# Patient Record
Sex: MALE | Race: WHITE | NOT HISPANIC OR LATINO
[De-identification: names, ages, dates, MRNs, and addresses within clinical notes are randomized per-mention and may not be internally consistent; named-entity substitution may affect disease eponyms.]

---

## 2017-10-06 ENCOUNTER — APPOINTMENT (OUTPATIENT)
Dept: CARDIOLOGY | Facility: CLINIC | Age: 77
End: 2017-10-06
Payer: MEDICARE

## 2017-10-06 PROCEDURE — 99214 OFFICE O/P EST MOD 30 MIN: CPT

## 2017-10-06 PROCEDURE — 93000 ELECTROCARDIOGRAM COMPLETE: CPT

## 2017-10-07 ENCOUNTER — TRANSCRIPTION ENCOUNTER (OUTPATIENT)
Age: 77
End: 2017-10-07

## 2017-10-12 ENCOUNTER — APPOINTMENT (OUTPATIENT)
Dept: CARDIOLOGY | Facility: CLINIC | Age: 77
End: 2017-10-12
Payer: MEDICARE

## 2017-10-17 PROCEDURE — 93224 XTRNL ECG REC UP TO 48 HRS: CPT

## 2017-10-19 ENCOUNTER — RECORD ABSTRACTING (OUTPATIENT)
Age: 77
End: 2017-10-19

## 2017-10-19 DIAGNOSIS — Z82.3 FAMILY HISTORY OF STROKE: ICD-10-CM

## 2017-10-19 DIAGNOSIS — Z87.891 PERSONAL HISTORY OF NICOTINE DEPENDENCE: ICD-10-CM

## 2017-10-19 DIAGNOSIS — Z87.448 PERSONAL HISTORY OF OTHER DISEASES OF URINARY SYSTEM: ICD-10-CM

## 2017-10-19 DIAGNOSIS — A63.0 ANOGENITAL (VENEREAL) WARTS: ICD-10-CM

## 2017-10-19 DIAGNOSIS — Z87.19 PERSONAL HISTORY OF OTHER DISEASES OF THE DIGESTIVE SYSTEM: ICD-10-CM

## 2017-10-19 DIAGNOSIS — Z80.9 FAMILY HISTORY OF MALIGNANT NEOPLASM, UNSPECIFIED: ICD-10-CM

## 2017-10-19 DIAGNOSIS — Z98.890 OTHER SPECIFIED POSTPROCEDURAL STATES: ICD-10-CM

## 2017-10-19 DIAGNOSIS — M10.9 GOUT, UNSPECIFIED: ICD-10-CM

## 2017-10-27 ENCOUNTER — APPOINTMENT (OUTPATIENT)
Dept: CARDIOLOGY | Facility: CLINIC | Age: 77
End: 2017-10-27

## 2017-11-06 ENCOUNTER — APPOINTMENT (OUTPATIENT)
Dept: CARDIOLOGY | Facility: CLINIC | Age: 77
End: 2017-11-06
Payer: MEDICARE

## 2017-11-06 PROCEDURE — 99215 OFFICE O/P EST HI 40 MIN: CPT

## 2017-12-05 ENCOUNTER — APPOINTMENT (OUTPATIENT)
Dept: CARDIOLOGY | Facility: CLINIC | Age: 77
End: 2017-12-05

## 2018-06-05 ENCOUNTER — NON-APPOINTMENT (OUTPATIENT)
Age: 78
End: 2018-06-05

## 2018-06-05 ENCOUNTER — APPOINTMENT (OUTPATIENT)
Dept: CARDIOLOGY | Facility: CLINIC | Age: 78
End: 2018-06-05
Payer: MEDICARE

## 2018-06-05 VITALS
HEIGHT: 67 IN | BODY MASS INDEX: 31.39 KG/M2 | DIASTOLIC BLOOD PRESSURE: 78 MMHG | WEIGHT: 200 LBS | SYSTOLIC BLOOD PRESSURE: 160 MMHG | HEART RATE: 48 BPM

## 2018-06-05 VITALS — DIASTOLIC BLOOD PRESSURE: 72 MMHG | SYSTOLIC BLOOD PRESSURE: 145 MMHG

## 2018-06-05 PROCEDURE — 99215 OFFICE O/P EST HI 40 MIN: CPT

## 2018-06-05 PROCEDURE — 93000 ELECTROCARDIOGRAM COMPLETE: CPT

## 2018-06-05 RX ORDER — FEBUXOSTAT 80 MG/1
80 TABLET ORAL DAILY
Refills: 0 | Status: DISCONTINUED | COMMUNITY
End: 2018-06-05

## 2018-06-08 ENCOUNTER — APPOINTMENT (OUTPATIENT)
Age: 78
End: 2018-06-08
Payer: MEDICARE

## 2018-06-08 PROCEDURE — 93224 XTRNL ECG REC UP TO 48 HRS: CPT

## 2018-06-20 ENCOUNTER — APPOINTMENT (OUTPATIENT)
Dept: CARDIOLOGY | Facility: CLINIC | Age: 78
End: 2018-06-20
Payer: MEDICARE

## 2018-06-20 PROCEDURE — 93306 TTE W/DOPPLER COMPLETE: CPT

## 2018-07-09 ENCOUNTER — APPOINTMENT (OUTPATIENT)
Dept: CARDIOLOGY | Facility: CLINIC | Age: 78
End: 2018-07-09
Payer: MEDICARE

## 2018-07-09 ENCOUNTER — RECORD ABSTRACTING (OUTPATIENT)
Age: 78
End: 2018-07-09

## 2018-07-09 VITALS
HEART RATE: 58 BPM | BODY MASS INDEX: 31.23 KG/M2 | WEIGHT: 199 LBS | HEIGHT: 67 IN | OXYGEN SATURATION: 99 % | DIASTOLIC BLOOD PRESSURE: 60 MMHG | SYSTOLIC BLOOD PRESSURE: 110 MMHG

## 2018-07-09 PROCEDURE — 99214 OFFICE O/P EST MOD 30 MIN: CPT

## 2018-07-09 RX ORDER — ASPIRIN 81 MG
81 TABLET, DELAYED RELEASE (ENTERIC COATED) ORAL DAILY
Refills: 0 | Status: DISCONTINUED | COMMUNITY
End: 2018-07-09

## 2019-06-18 ENCOUNTER — NON-APPOINTMENT (OUTPATIENT)
Age: 79
End: 2019-06-18

## 2019-06-18 ENCOUNTER — APPOINTMENT (OUTPATIENT)
Dept: CARDIOLOGY | Facility: CLINIC | Age: 79
End: 2019-06-18
Payer: MEDICARE

## 2019-06-18 VITALS
BODY MASS INDEX: 30.45 KG/M2 | DIASTOLIC BLOOD PRESSURE: 70 MMHG | SYSTOLIC BLOOD PRESSURE: 142 MMHG | HEART RATE: 47 BPM | HEIGHT: 67 IN | WEIGHT: 194 LBS

## 2019-06-18 PROCEDURE — 0296T: CPT | Mod: 59

## 2019-06-18 PROCEDURE — 93000 ELECTROCARDIOGRAM COMPLETE: CPT

## 2019-06-18 PROCEDURE — 99215 OFFICE O/P EST HI 40 MIN: CPT

## 2019-06-18 RX ORDER — RAMIPRIL 10 MG/1
10 CAPSULE ORAL
Refills: 0 | Status: DISCONTINUED | COMMUNITY
End: 2019-06-18

## 2019-06-18 RX ORDER — ROSUVASTATIN CALCIUM 20 MG/1
20 TABLET, FILM COATED ORAL
Refills: 0 | Status: DISCONTINUED | COMMUNITY
End: 2019-06-18

## 2019-06-18 NOTE — REASON FOR VISIT
[Follow-Up - Clinic] : a clinic follow-up of [Abnormal ECG] : an abnormal ECG [A-V Block] : A-V block [Hyperlipidemia] : hyperlipidemia [Hypertension] : hypertension [Medication Management] : Medication management [Peripheral Vascular Disease] : peripheral vascular disease [Sick Sinus Syndrome] : sick sinus syndrome

## 2019-06-19 ENCOUNTER — TRANSCRIPTION ENCOUNTER (OUTPATIENT)
Age: 79
End: 2019-06-19

## 2019-06-25 NOTE — PHYSICAL EXAM
[General Appearance - Well Developed] : well developed [Normal Appearance] : normal appearance [Well Groomed] : well groomed [General Appearance - Well Nourished] : well nourished [No Deformities] : no deformities [General Appearance - In No Acute Distress] : no acute distress [Normal Conjunctiva] : the conjunctiva exhibited no abnormalities [Eyelids - No Xanthelasma] : the eyelids demonstrated no xanthelasmas [Normal Oral Mucosa] : normal oral mucosa [No Oral Pallor] : no oral pallor [No Oral Cyanosis] : no oral cyanosis [Normal Jugular Venous A Waves Present] : normal jugular venous A waves present [Normal Jugular Venous V Waves Present] : normal jugular venous V waves present [No Jugular Venous Tran A Waves] : no jugular venous tran A waves [Respiration, Rhythm And Depth] : normal respiratory rhythm and effort [Exaggerated Use Of Accessory Muscles For Inspiration] : no accessory muscle use [Auscultation Breath Sounds / Voice Sounds] : lungs were clear to auscultation bilaterally [Heart Rate And Rhythm] : heart rate and rhythm were normal [Heart Sounds] : normal S1 and S2 [Murmurs] : no murmurs present [Abdomen Soft] : soft [Abdomen Tenderness] : non-tender [Abdomen Mass (___ Cm)] : no abdominal mass palpated [Abnormal Walk] : normal gait [Gait - Sufficient For Exercise Testing] : the gait was sufficient for exercise testing [Nail Clubbing] : no clubbing of the fingernails [Cyanosis, Localized] : no localized cyanosis [Petechial Hemorrhages (___cm)] : no petechial hemorrhages [Skin Color & Pigmentation] : normal skin color and pigmentation [] : no rash [No Venous Stasis] : no venous stasis [Skin Lesions] : no skin lesions [No Skin Ulcers] : no skin ulcer [No Xanthoma] : no  xanthoma was observed [Oriented To Time, Place, And Person] : oriented to person, place, and time [Affect] : the affect was normal [Mood] : the mood was normal [No Anxiety] : not feeling anxious [FreeTextEntry1] : tr edema

## 2019-06-25 NOTE — HISTORY OF PRESENT ILLNESS
[FreeTextEntry1] : JOSE ALBERTO MENDEZ  is a 78 year old  M\par Concomitant cardiovascular care by Dr. Alejandro Martin at [facility]\par Also primary physician is Dr. Shin at [facility] and seen by electrophysiology at Dr. Geo Chaudhry as well as Dr. Neeraj Larry for long-standing hypertension. \par \par There is a history of coronary artery disease.  In 1996, had angina and underwent cardiac catheterization at [facility], which was complicated by a left main dissection which subsequently led to two-vessel bypass surgery.  He had a repeat cardiac catheterization in 2006 and underwent PCI to right coronary artery. There is no clinical history of a myocardial infarction.  Also told of carotid atherosclerosis.   No recent ischemic evaluation\par \par There is a long-standing history of hypertension since the age of 23 and particularly his blood pressure can be sensitive to increased sodium intake with associated development of peripheral edema for which he self adjusts torsemide dose whe eating outside the home. There is no prior history of symptomatic congestive heart failure or LV dysfunction.   There is CRI, followed at [facility].  Saw Dr Larry, lowered dose of doxazosin due to orthostasis.  There is a history of microalbuminuria.  Takes additional dose of torsemide based on diet.  \par \par h/o  atrial tachycardia and atrial flutter, ? asymptomatic. He underwent an ablation procedure with Dr. Chaudhry. There is also a history of chronic PVCs as well as AV conduction disease. 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  He is intolerant to beta-blocker therapy.   Unware of premature beats.  Saw EP and and no changes were made. \par \par Previously he has been told of mild valvular heart disease. \par There is no prior history of cerebrovascular disease.  \par Now on CPAP for sleep apnea. \par \par No recent hospitalization, procedures\par \par Outside EKG reviewed. Outside EKG demonstrated 2nd degree block with heart rate less than 40. Subsequent office EKG is sinus bradycardia with first degree AV block. \par He underwent a Holter. He was told to go to the emergency room. \par \par Has absolutely no symptoms. \par Does work with  and does bike. \par He remains physically active\par No significant exertional symptoms. \par There is no chest pain, no orthopnea, no significant dyspnea, no symptomatic palpitations, lightheadedness, dizziness, or syncope. \par In the interim, he had neurosurgical resection of a meningioma. \par Now on a lower dose of Crestor and ramipril \par Adjusts his dose of torsemide based on sodium intake.\par \par EKG AF nonspecific ST changes\par \par Echocardiogram. June 2018 Normal left ventricular function. Ejection fraction 55%. Moderate left atrial enlargement. Mild valvular heart disease. Afib throughout test\par \par Holter monitor June 2018, atrial fibrillation, minimum heart rate 22 beats per minute, maximum heart rate 100 beats per minute, average heart rate 50 beats per minute. Pauses of 2-4 seconds noted. Wide-complex tachycardia with aberrancy versus VT 3 beats noted.\par \par Reviewed electrophysiology report from June 2012. At that time underwent catheter ablation of typical isthmus-dependent right atrial flutter. There is acceptable baseline AV conduction noted. Reviewed electrophysiology notes from Dr. Chaudhry from May of 2012. \par \par Transesophageal echocardiogram from June 2012 notable for complex aortic atherosclerosis with mild valvular heart disease\par \par Cardiac catheterization report in May 2006 successful stent performed on 90% lesion in mid RCA. \par \par Nuclear stress test February 2013. Lino protocol. 9 minutes 50 seconds. Ischemic EKG response. Abnormal perfusion with mild fixed defects involving mid to distal anterior wall and apex. Mild to moderate fixed defects involving inferior wall. \par \par April 2018. Hemoglobin 13.0, potassium 5.1, creatinine 2.4. HDL 38, LDL 43, total cholesterol 132. \par

## 2019-06-25 NOTE — ASSESSMENT
[FreeTextEntry1] : Recent testing has been requested from [facility]\par \par Atrial Flutter: s/p RFA Chaudhry [facility]\par Baseline AV conduction disease, First degree AV block second degree AV block\par PVC/NSVT: normal EF outflow tract?\par AF\par Reviewed diagnosis natural history and pathophysiology. \par Followup Zio patch. \par Elevated thromboembolic risk profile. Renally dosed Xarelto 15\par Followup with electrophysiology.\par Intolerant of beta blocker\par Monitor LV function\par Treatment of underlying obstructive sleep apnea.\par \par CAD: PCI BMS to L cxL main dissection then 2v CABG '96 (LIMA to LAD SVG to OM1) repeat PCI NEAL RCA '06.\par Prior stent and bypass grafting\par Last cardiac catheterization report and stress as outlined above.  \par Followup echocardiogram.\par No angina.  \par Daily aspirin and statin therapy.\par Last cholesterol profile reviewed. \par \par Longstanding HTN Larry\par CRI, Rogosin\par Edema\par Hypertensive heart disease and renal insufficiency\par Proteinuria\par Maintained on ACE inhibitor, cardura and torsemide. \par \par Atherosclerosis: complex atheroma carotid stenosis (moderate).\par Abdominal ultrasound, risk factor modification as above.\par

## 2019-06-26 ENCOUNTER — APPOINTMENT (OUTPATIENT)
Dept: CARDIOLOGY | Facility: CLINIC | Age: 79
End: 2019-06-26
Payer: MEDICARE

## 2019-06-26 PROCEDURE — 93306 TTE W/DOPPLER COMPLETE: CPT

## 2019-06-28 PROCEDURE — 0298T: CPT

## 2019-07-15 ENCOUNTER — APPOINTMENT (OUTPATIENT)
Dept: CARDIOLOGY | Facility: CLINIC | Age: 79
End: 2019-07-15
Payer: MEDICARE

## 2019-07-15 VITALS
SYSTOLIC BLOOD PRESSURE: 162 MMHG | HEIGHT: 67 IN | WEIGHT: 198 LBS | DIASTOLIC BLOOD PRESSURE: 68 MMHG | HEART RATE: 77 BPM | OXYGEN SATURATION: 97 % | BODY MASS INDEX: 31.08 KG/M2

## 2019-07-15 DIAGNOSIS — G47.33 OBSTRUCTIVE SLEEP APNEA (ADULT) (PEDIATRIC): ICD-10-CM

## 2019-07-15 PROCEDURE — 99215 OFFICE O/P EST HI 40 MIN: CPT

## 2019-07-15 RX ORDER — ALLOPURINOL 100 MG/1
100 TABLET ORAL DAILY
Refills: 0 | Status: DISCONTINUED | COMMUNITY
End: 2019-07-15

## 2019-07-18 PROBLEM — G47.33 OBSTRUCTIVE SLEEP APNEA, ADULT: Status: ACTIVE | Noted: 2018-06-05

## 2019-07-18 NOTE — ASSESSMENT
[FreeTextEntry1] : Atrial Flutter: s/p RFA Chaudhry [facility]\par Baseline AV conduction disease\par PVC/NSVT: normal EF outflow tract?\par AF\par Reviewed diagnosis natural history and pathophysiology. \par Monitor reviewed\par Elevated thromboembolic risk profile. Renally dosed Xarelto 15\par Followup with electrophysiology.\par Intolerant of beta blocker\par Treatment of underlying obstructive sleep apnea.\par \par CAD: PCI BMS to L cxL main dissection then 2v CABG '96 (LIMA to LAD SVG to OM1) repeat PCI NEAL RCA '06.\par Prior stent and bypass grafting\par Last cardiac catheterization report \par No angina.  \par Normal EF\par Follow up stress\par Daily aspirin and statin therapy.\par \par Longstanding HTN Larry\par CRI, Rogosin\par Edema\par Hypertensive heart disease and renal insufficiency\par Proteinuria\par Maintained on ACE inhibitor, cardura and torsemide. \par \par Atherosclerosis: complex atheroma carotid stenosis (moderate).\par Abdominal ultrasound, risk factor modification as above.\par

## 2019-07-18 NOTE — HISTORY OF PRESENT ILLNESS
[FreeTextEntry1] : JOSE ALBERTO MENDEZ  is a 78 year old  M\par \par Concomitant cardiovascular care by Dr. Alejandro Martin at [facility]\par Also primary physician is Dr. Shin at [facility] and seen by electrophysiology at Dr. Geo Chaudhry as well as Dr. Neeraj Larry for long-standing hypertension. \par \par There is a history of coronary artery disease.  In 1996, had angina and underwent cardiac catheterization at [facility], which was complicated by a left main dissection which subsequently led to two-vessel bypass surgery.  He had a repeat cardiac catheterization in 2006 and underwent PCI to right coronary artery. There is no clinical history of a myocardial infarction.  Also told of carotid atherosclerosis.   No recent ischemic evaluation\par \par There is a long-standing history of hypertension since the age of 23 and particularly his blood pressure can be sensitive to increased sodium intake with associated development of peripheral edema for which he self adjusts torsemide dose whe eating outside the home. There is no prior history of LV dysfunction.   There is CRI, followed at [facility].  Saw Dr Larry, lowered dose of doxazosin due to orthostasis.  There is a history of microalbuminuria.  \par \par h/o  atrial tachycardia and atrial flutter, ? asymptomatic. He underwent an ablation procedure with Dr. Chaudhry. There is also a history of chronic PVCs as well as AV conduction disease.  He is intolerant to beta-blocker therapy.   Unware of premature beats.  Saw EP and and no changes were made. \par \par Previously he has been told of mild valvular heart disease. \par There is no prior history of cerebrovascular disease.  \par \par Works with \par He remains physically active\par No significant exertional symptoms. \par There is no chest pain, no orthopnea, no significant dyspnea, no symptomatic palpitations, lightheadedness, dizziness, or syncope. \par Restarted CPAP therapy. \par Scheduled to see Dr. Martin in the fall. \par \par Echocardiogram June 2019 is normal left ventricular function. Mild mitral regurgitation. Mild aortic stenosis. Left atrial enlargement. \par Monitor demonstrated average heart rate of 55. Persistent atrial fibrillation. Ventricular ectopy.  Bradycardia, but no pauses greater than 5 seconds pauses up to 3.7 seconds \par \par Reviewed electrophysiology report from June 2012. At that time underwent catheter ablation of typical isthmus-dependent right atrial flutter. There is acceptable baseline AV conduction noted. Reviewed electrophysiology notes from Dr. hCaudhry from May of 2012. \par \par Transesophageal echocardiogram from June 2012 notable for complex aortic atherosclerosis with mild valvular heart disease\par \par Cardiac catheterization report in May 2006 successful stent performed on 90% lesion in mid RCA. \par \par Nuclear stress test February 2013. Lino protocol. 9 minutes 50 seconds. Ischemic EKG response. Abnormal perfusion with mild fixed defects involving mid to distal anterior wall and apex. Mild to moderate fixed defects involving inferior wall. \par \par April 2018. Hemoglobin 13.0, potassium 5.1, creatinine 2.4. HDL 38, LDL 43, total cholesterol 132. \par

## 2020-06-23 ENCOUNTER — APPOINTMENT (OUTPATIENT)
Dept: CARDIOLOGY | Facility: CLINIC | Age: 80
End: 2020-06-23

## 2020-08-24 ENCOUNTER — NON-APPOINTMENT (OUTPATIENT)
Age: 80
End: 2020-08-24

## 2020-08-24 ENCOUNTER — APPOINTMENT (OUTPATIENT)
Dept: CARDIOLOGY | Facility: CLINIC | Age: 80
End: 2020-08-24
Payer: MEDICARE

## 2020-08-24 VITALS
DIASTOLIC BLOOD PRESSURE: 70 MMHG | OXYGEN SATURATION: 98 % | WEIGHT: 170 LBS | BODY MASS INDEX: 26.68 KG/M2 | SYSTOLIC BLOOD PRESSURE: 140 MMHG | HEIGHT: 67 IN | HEART RATE: 68 BPM

## 2020-08-24 DIAGNOSIS — Z00.00 ENCOUNTER FOR GENERAL ADULT MEDICAL EXAMINATION W/OUT ABNORMAL FINDINGS: ICD-10-CM

## 2020-08-24 PROCEDURE — 0296T: CPT

## 2020-08-24 PROCEDURE — 99214 OFFICE O/P EST MOD 30 MIN: CPT

## 2020-08-24 PROCEDURE — 93000 ELECTROCARDIOGRAM COMPLETE: CPT

## 2020-08-25 NOTE — REASON FOR VISIT
[Follow-Up - Clinic] : a clinic follow-up of [Abnormal ECG] : an abnormal ECG [A-V Block] : A-V block [Hyperlipidemia] : hyperlipidemia [Hypertension] : hypertension [Peripheral Vascular Disease] : peripheral vascular disease [Medication Management] : Medication management [Sick Sinus Syndrome] : sick sinus syndrome

## 2020-08-25 NOTE — PHYSICAL EXAM
[General Appearance - Well Developed] : well developed [Normal Appearance] : normal appearance [Well Groomed] : well groomed [General Appearance - Well Nourished] : well nourished [No Deformities] : no deformities [General Appearance - In No Acute Distress] : no acute distress [Normal Conjunctiva] : the conjunctiva exhibited no abnormalities [Eyelids - No Xanthelasma] : the eyelids demonstrated no xanthelasmas [Normal Oral Mucosa] : normal oral mucosa [No Oral Pallor] : no oral pallor [No Oral Cyanosis] : no oral cyanosis [Normal Jugular Venous A Waves Present] : normal jugular venous A waves present [No Jugular Venous Tran A Waves] : no jugular venous tran A waves [Normal Jugular Venous V Waves Present] : normal jugular venous V waves present [Respiration, Rhythm And Depth] : normal respiratory rhythm and effort [Exaggerated Use Of Accessory Muscles For Inspiration] : no accessory muscle use [Auscultation Breath Sounds / Voice Sounds] : lungs were clear to auscultation bilaterally [Heart Rate And Rhythm] : heart rate and rhythm were normal [Heart Sounds] : normal S1 and S2 [Murmurs] : no murmurs present [Abdomen Tenderness] : non-tender [Abdomen Soft] : soft [Gait - Sufficient For Exercise Testing] : the gait was sufficient for exercise testing [Abdomen Mass (___ Cm)] : no abdominal mass palpated [Abnormal Walk] : normal gait [Nail Clubbing] : no clubbing of the fingernails [Cyanosis, Localized] : no localized cyanosis [Petechial Hemorrhages (___cm)] : no petechial hemorrhages [] : no ischemic changes [Skin Color & Pigmentation] : normal skin color and pigmentation [No Venous Stasis] : no venous stasis [No Xanthoma] : no  xanthoma was observed [No Skin Ulcers] : no skin ulcer [Skin Lesions] : no skin lesions [Affect] : the affect was normal [Oriented To Time, Place, And Person] : oriented to person, place, and time [Mood] : the mood was normal [No Anxiety] : not feeling anxious [FreeTextEntry1] : tr edema

## 2020-08-25 NOTE — ASSESSMENT
[FreeTextEntry1] : Atrial Flutter: s/p RFA Chaudhry [facility]\par Baseline AV conduction disease\par PVC/NSVT: normal EF outflow tract?\par AF\par Elevated thromboembolic risk profile. Renally dosed Xarelto 15\par Intolerant of beta blocker\par Declines rx sleep apnea.\par \par CAD: PCI BMS to L cxL main dissection then 2v CABG '96 (LIMA to LAD SVG to OM1) repeat PCI NEAL RCA '06.\par Prior stent and bypass grafting\par No angina.  \par Daily aspirin and statin therapy.\par \par Longstanding HTN \par CRI, Rogosin\par Hypertensive heart disease and renal insufficiency\par Proteinuria\par Blood pressure is borderline on my exam\par Maintained on ACE inhibitor, cardura and torsemide. \par Follow-up with nephrology \par \par Atherosclerosis: complex atheroma carotid stenosis (moderate).\par Abdominal ultrasound, risk factor modification as above.\par \par Will readdress ischemic evaluation at clinical follow-up \par Follow-up echocardiogram and Holter monitor \par

## 2020-08-25 NOTE — HISTORY OF PRESENT ILLNESS
[FreeTextEntry1] : JOSE ALBERTO MENDEZ  is a 78 year old  M\par \par \par Concomitant cardiovascular care by Dr. Alejandro Martin at [facility]\par Also primary physician is Dr. Shin at [facility] and seen by electrophysiology at Dr. Geo Chaudhry as well as Dr. Neeraj Larry for long-standing hypertension. \par \par There is a history of coronary artery disease.  In 1996, had angina and underwent cardiac catheterization at [facility], which was complicated by a left main dissection which subsequently led to two-vessel bypass surgery.  He had a repeat cardiac catheterization in 2006 and underwent PCI to right coronary artery. There is no clinical history of a myocardial infarction.  Also told of carotid atherosclerosis.   No recent ischemic evaluation\par \par There is a long-standing history of hypertension since the age of 23 and particularly his blood pressure can be sensitive to increased sodium intake with associated development of peripheral edema for which he self adjusts torsemide dose when eating outside the home. There is no prior history of LV dysfunction.   There is CRI, followed at [facility].   There is a history of microalbuminuria.  \par \par h/o  atrial tachycardia and atrial flutter, ? asymptomatic. He underwent an ablation procedure with Dr. Chaudhry. There is also a history of chronic PVCs as well as AV conduction disease.  He is intolerant to beta-blocker therapy.   Unaware of premature beats.  \par \par There is no prior history of cerebrovascular disease.  \par \par He remains physically active\par No significant exertional symptoms. \par There is no chest pain, no orthopnea, no significant dyspnea, no symptomatic palpitations, lightheadedness, dizziness, or syncope. \par Wife reports cold intolerance\par Home blood pressure and weight log has been reviewed.  \par He is active spinning and does yoga.  \par \par Blood pressure is better than when he was living in [facility].  He takes 10 mg of torsemide daily and 20 mg if his blood pressure is elevated.  \par \par EKG demonstrates atrial fibrillation with slow ventricular rate and nonspecific ST changes.  \par \par Last blood work June 2020 hemoglobin 12.8 creatinine 2.5 potassium 5.1 total cholesterol 116 LDL 54 \par \par Echocardiogram June 2019 is normal left ventricular function. Mild mitral regurgitation. Mild aortic stenosis. Left atrial enlargement. \par Monitor demonstrated average heart rate of 55. Persistent atrial fibrillation. Ventricular ectopy.  Bradycardia, but no pauses greater than 5 seconds pauses up to 3.7 seconds \par \par Reviewed electrophysiology report from June 2012. At that time underwent catheter ablation of typical isthmus-dependent right atrial flutter. There is acceptable baseline AV conduction noted. Reviewed electrophysiology notes from Dr. Chaudhry from May of 2012. \par \par Transesophageal echocardiogram from June 2012 notable for complex aortic atherosclerosis with mild valvular heart disease\par \par Cardiac catheterization report in May 2006 successful stent performed on 90% lesion in mid RCA. \par \par Nuclear stress test February 2013. Lino protocol. 9 minutes 50 seconds. Ischemic EKG response. Abnormal perfusion with mild fixed defects involving mid to distal anterior wall and apex. Mild to moderate fixed defects involving inferior wall. \par \par Outside carotid with R sided moderate plaque\par

## 2020-09-01 ENCOUNTER — APPOINTMENT (OUTPATIENT)
Dept: CARDIOLOGY | Facility: CLINIC | Age: 80
End: 2020-09-01
Payer: MEDICARE

## 2020-09-01 PROCEDURE — 93306 TTE W/DOPPLER COMPLETE: CPT

## 2020-09-14 ENCOUNTER — APPOINTMENT (OUTPATIENT)
Dept: ELECTROPHYSIOLOGY | Facility: CLINIC | Age: 80
End: 2020-09-14

## 2020-09-14 ENCOUNTER — APPOINTMENT (OUTPATIENT)
Dept: CARDIOLOGY | Facility: CLINIC | Age: 80
End: 2020-09-14
Payer: MEDICARE

## 2020-09-14 VITALS
HEIGHT: 67 IN | OXYGEN SATURATION: 98 % | HEART RATE: 72 BPM | WEIGHT: 172 LBS | BODY MASS INDEX: 27 KG/M2 | SYSTOLIC BLOOD PRESSURE: 142 MMHG | TEMPERATURE: 98.2 F | DIASTOLIC BLOOD PRESSURE: 66 MMHG

## 2020-09-14 PROCEDURE — 99215 OFFICE O/P EST HI 40 MIN: CPT

## 2020-09-14 PROCEDURE — 0298T: CPT

## 2020-09-14 NOTE — ASSESSMENT
[FreeTextEntry1] : Atrial Flutter: s/p RFA Chaudhry [facility]\par Baseline AV conduction disease\par PVC/NSVT: normal EF outflow tract?\par AF\par Elevated thromboembolic risk profile. Renally dosed Xarelto 15\par Intolerant of beta blocker\par Declines rx sleep apnea.\par \par Patient underwent zio patch. Results discussed with Dr. Andino who will discuss with concomitant cardiologist and EP physicians as detailed above. Also, per patient preference, will defer anti-HTN medication changes to MD detailed above. Continue renally dosed xarelto. Continue statin, aceI, and diuretic, and aspirin. No angina. CKD is managed in [place]. \par \par Atherosclerosis: complex atheroma carotid stenosis (moderate).\par Abdominal ultrasound, risk factor modification as above.

## 2020-09-14 NOTE — HISTORY OF PRESENT ILLNESS
[FreeTextEntry1] : JOSE ALBERTO MENDEZ  is a 79 year old M here for follow up and after Zio patch. He was asymptomatic throughout the 7 days (he accidentally clicked the device and transmitted a patient triggered event in error).  The patch was significant for 100% atrial fibrillation burden with max pause length 3 seconds, slowest HR 25 bpm and 7% ventricular ectopy with 8 runs of NSVT. He had a TTE 9/1/20 which was signifiacnt for moderate aortic stenosis, severe LAE, biventricular function preserved, RV enlargement, RVSP 46 mm Hg.\par \par He denies denies chest pain, shortness of breath, orthopnea, PND, LE edema, syncope, near syncope. He has purposely lost weight since last visit. Of note, he feels cold intermittently and reports his TFTs have been checked routinely and are unrevealing.\par \par From prior notes: "Concomitant cardiovascular care by Dr. Alejandro Martin at [facility]\par Also primary physician is Dr. Shin at [facility] and seen by electrophysiology at Dr. Geo Chaudhry as well as Dr. Neeraj Larry for long-standing hypertension. \par \par There is a history of coronary artery disease.  In 1996, had angina and underwent cardiac catheterization at [facility], which was complicated by a left main dissection which subsequently led to two-vessel bypass surgery.  He had a repeat cardiac catheterization in 2006 and underwent PCI to right coronary artery. There is no clinical history of a myocardial infarction.  Also told of carotid atherosclerosis.   No recent ischemic evaluation\par \par There is a long-standing history of hypertension since the age of 23 and particularly his blood pressure can be sensitive to increased sodium intake with associated development of peripheral edema for which he self adjusts torsemide dose when eating outside the home. There is no prior history of LV dysfunction.   There is CRI, followed at [facility].   There is a history of microalbuminuria.  \par \par h/o  atrial tachycardia and atrial flutter, ? asymptomatic. He underwent an ablation procedure with Dr. Chaudhry. There is also a history of chronic PVCs as well as AV conduction disease.  He is intolerant to beta-blocker therapy.   Unaware of premature beats.  \par \par There is no prior history of cerebrovascular disease.  \par \par Nuclear stress test February 2013. Lino protocol. 9 minutes 50 seconds. Ischemic EKG response. Abnormal perfusion with mild fixed defects involving mid to distal anterior wall and apex. Mild to moderate fixed defects involving inferior wall. \par \par Outside carotid with R sided moderate plaque"\par

## 2020-09-14 NOTE — REASON FOR VISIT
[Follow-Up - Clinic] : a clinic follow-up of [Abnormal ECG] : an abnormal ECG [Hyperlipidemia] : hyperlipidemia [A-V Block] : A-V block [Hypertension] : hypertension [Medication Management] : Medication management [Spouse] : spouse

## 2020-09-14 NOTE — PHYSICAL EXAM
[General Appearance - Well Developed] : well developed [Normal Appearance] : normal appearance [Well Groomed] : well groomed [General Appearance - Well Nourished] : well nourished [No Deformities] : no deformities [General Appearance - In No Acute Distress] : no acute distress [Eyelids - No Xanthelasma] : the eyelids demonstrated no xanthelasmas [Normal Conjunctiva] : the conjunctiva exhibited no abnormalities [No Oral Pallor] : no oral pallor [Normal Oral Mucosa] : normal oral mucosa [No Oral Cyanosis] : no oral cyanosis [Normal Jugular Venous V Waves Present] : normal jugular venous V waves present [Normal Jugular Venous A Waves Present] : normal jugular venous A waves present [Respiration, Rhythm And Depth] : normal respiratory rhythm and effort [No Jugular Venous Tran A Waves] : no jugular venous tran A waves [Exaggerated Use Of Accessory Muscles For Inspiration] : no accessory muscle use [Auscultation Breath Sounds / Voice Sounds] : lungs were clear to auscultation bilaterally [Heart Rate And Rhythm] : heart rate and rhythm were normal [Heart Sounds] : normal S1 and S2 [Abdomen Soft] : soft [Abdomen Tenderness] : non-tender [Abdomen Mass (___ Cm)] : no abdominal mass palpated [Abnormal Walk] : normal gait [Gait - Sufficient For Exercise Testing] : the gait was sufficient for exercise testing [Nail Clubbing] : no clubbing of the fingernails [Petechial Hemorrhages (___cm)] : no petechial hemorrhages [Cyanosis, Localized] : no localized cyanosis [Skin Color & Pigmentation] : normal skin color and pigmentation [] : no ischemic changes [Skin Lesions] : no skin lesions [No Venous Stasis] : no venous stasis [No Skin Ulcers] : no skin ulcer [No Xanthoma] : no  xanthoma was observed [Affect] : the affect was normal [Oriented To Time, Place, And Person] : oriented to person, place, and time [Mood] : the mood was normal [No Anxiety] : not feeling anxious [FreeTextEntry1] : tr edema, 2/6 systolic AS murmur

## 2020-09-21 ENCOUNTER — APPOINTMENT (OUTPATIENT)
Dept: CARDIOLOGY | Facility: CLINIC | Age: 80
End: 2020-09-21

## 2020-09-29 ENCOUNTER — APPOINTMENT (OUTPATIENT)
Dept: CARDIOLOGY | Facility: CLINIC | Age: 80
End: 2020-09-29

## 2020-11-05 ENCOUNTER — TRANSCRIPTION ENCOUNTER (OUTPATIENT)
Age: 80
End: 2020-11-05

## 2020-11-06 ENCOUNTER — TRANSCRIPTION ENCOUNTER (OUTPATIENT)
Age: 80
End: 2020-11-06

## 2021-03-23 ENCOUNTER — NON-APPOINTMENT (OUTPATIENT)
Age: 81
End: 2021-03-23

## 2021-08-23 ENCOUNTER — APPOINTMENT (OUTPATIENT)
Dept: CARDIOLOGY | Facility: CLINIC | Age: 81
End: 2021-08-23
Payer: MEDICARE

## 2021-08-23 VITALS
HEIGHT: 67 IN | WEIGHT: 183 LBS | BODY MASS INDEX: 28.72 KG/M2 | DIASTOLIC BLOOD PRESSURE: 74 MMHG | OXYGEN SATURATION: 98 % | SYSTOLIC BLOOD PRESSURE: 122 MMHG | TEMPERATURE: 97.8 F | HEART RATE: 62 BPM

## 2021-08-23 DIAGNOSIS — I45.9 CONDUCTION DISORDER, UNSPECIFIED: ICD-10-CM

## 2021-08-23 PROCEDURE — 99214 OFFICE O/P EST MOD 30 MIN: CPT

## 2021-08-23 PROCEDURE — 93000 ELECTROCARDIOGRAM COMPLETE: CPT

## 2021-08-24 ENCOUNTER — NON-APPOINTMENT (OUTPATIENT)
Age: 81
End: 2021-08-24

## 2021-08-24 NOTE — REASON FOR VISIT
[Arrhythmia/ECG Abnorrmalities] : arrhythmia/ECG abnormalities [Coronary Artery Disease] : coronary artery disease [Carotid, Aortic and Peripheral Vascular Disease] : carotid, aortic and peripheral vascular disease

## 2021-08-28 NOTE — HISTORY OF PRESENT ILLNESS
[FreeTextEntry1] : JOSE ALBERTO MENDEZ  is a 80 year old  M\par \par Concomitant cardiovascular care by Dr. Alejandro Martin at [facility]\par Also primary physician is Dr. Shin at [facility] and seen by electrophysiology at Dr. Geo Chaudhry as well as Dr. Neeraj Larry for long-standing hypertension. \par \par There is a history of coronary artery disease. In 1996, had angina and underwent cardiac catheterization at [facility], which was complicated by a left main dissection which subsequently led to two-vessel bypass surgery. He had a repeat cardiac catheterization in 2006 and underwent PCI to right coronary artery. There is no clinical history of a myocardial infarction. Also told of carotid atherosclerosis. No recent ischemic evaluation\par \par There is a long-standing history of hypertension since the age of 23 and particularly his blood pressure can be sensitive to increased sodium intake with associated development of peripheral edema for which he self adjusts torsemide dose when eating outside the home. There is no prior history of LV dysfunction. There is CRI, followed at [facility]. There is a history of microalbuminuria. \par \par h/o atrial tachycardia and atrial flutter, ? asymptomatic. He underwent an ablation procedure with Dr. Chaudhry. There is also a history of chronic PVCs as well as AV conduction disease. He is intolerant to beta-blocker therapy. Unaware of premature beats. \par \par There is no chest pain, no orthopnea, no significant dyspnea, no symptomatic palpitations, lightheadedness, dizziness, or syncope. \par \par in the interim he reports being diagnosed with high-grade meningioma \par he underwent resection he had subsequent paralysis \par he did acute rehab at [facility] then inpatient rehab and now does outpatient rehab at [facility] \par there wis peripheral edema and torsemide dose was increased \par \par EKG today demonstrates atrial fibrillation at 62 bpm\par Last echocardiogram demonstrates normal left ventricular function mild mitral mitral regurgitation moderate aortic stenosis mild pulmonary hypertension \par \par electrophysiology report from June 2012. At that time underwent catheter ablation of typical isthmus-dependent right atrial flutter. There is acceptable baseline AV conduction noted. \par \par Cardiac catheterization report in May 2006 successful stent performed on 90% lesion in mid RCA. \par \par Nuclear stress test February 2013. Lino protocol. 9 minutes 50 seconds. Ischemic EKG response. Abnormal perfusion with mild fixed defects involving mid to distal anterior wall and apex. Mild to moderate fixed defects involving inferior wall. \par \par history of atrial fibrillation bradycardia \par continue anticoagulation\par he reports having a follow-up echocardiogram and carotid Doppler at [facility] \par recent carotid Doppler and echocardiogram have been requested \par follow-up monitor to evaluate need for device placement\par \par Atrial Flutter: s/p RFA Chaudhry [facility]\par Baseline AV conduction disease\par PVC/NSVT: normal EF outflow tract?\par AF\par Elevated thromboembolic risk profile. Renally dosed Xarelto 15\par Intolerant of beta blocker\par rx sleep apnea.\par \par CAD: PCI BMS to L cxL main dissection then 2v CABG '96 (LIMA to LAD SVG to OM1) repeat PCI NEAL RCA '06.\par Prior stent and bypass grafting\par No angina. \par Daily aspirin and statin therapy.\par \par Hypertensive heart disease and renal insufficiency\par Proteinuria\par Follow-up with nephrology \par \par Atherosclerosis: complex atheroma carotid stenosis \par Abdominal ultrasound, risk factor modification as above.

## 2021-08-28 NOTE — ASSESSMENT
[FreeTextEntry1] : \par history of atrial fibrillation bradycardia \par continue anticoagulation\par he reports having a follow-up echocardiogram and carotid Doppler at [facility] \par recent carotid Doppler and echocardiogram have been requested \par follow-up monitor to evaluate need for device placement\par \par Atrial Flutter: s/p RFA Chaudhry [place]\par Baseline AV conduction disease\par PVC/NSVT: normal EF outflow tract?\par AF\par Elevated thromboembolic risk profile. Renally dosed Xarelto 15\par Intolerant of beta blocker\par rx sleep apnea.\par \par CAD: PCI BMS to L cxL main dissection then 2v CABG '96 (LIMA to LAD SVG to OM1) repeat PCI NEAL RCA '06.\par Prior stent and bypass grafting\par No angina. \par Daily aspirin and statin therapy.\par \par Hypertensive heart disease and renal insufficiency\par Proteinuria\par Follow-up with nephrology \par \par Atherosclerosis: complex atheroma carotid stenosis \par Abdominal ultrasound, risk factor modification as above.

## 2021-10-01 ENCOUNTER — APPOINTMENT (OUTPATIENT)
Dept: CARDIOLOGY | Facility: CLINIC | Age: 81
End: 2021-10-01

## 2021-10-15 ENCOUNTER — NON-APPOINTMENT (OUTPATIENT)
Age: 81
End: 2021-10-15

## 2021-11-11 ENCOUNTER — APPOINTMENT (OUTPATIENT)
Dept: CARDIOLOGY | Facility: CLINIC | Age: 81
End: 2021-11-11
Payer: MEDICARE

## 2021-11-11 PROCEDURE — 93306 TTE W/DOPPLER COMPLETE: CPT

## 2021-11-18 ENCOUNTER — NON-APPOINTMENT (OUTPATIENT)
Age: 81
End: 2021-11-18

## 2021-12-09 ENCOUNTER — NON-APPOINTMENT (OUTPATIENT)
Age: 81
End: 2021-12-09

## 2022-04-18 ENCOUNTER — TRANSCRIPTION ENCOUNTER (OUTPATIENT)
Age: 82
End: 2022-04-18

## 2022-05-08 ENCOUNTER — NON-APPOINTMENT (OUTPATIENT)
Age: 82
End: 2022-05-08

## 2022-10-09 ENCOUNTER — NON-APPOINTMENT (OUTPATIENT)
Age: 82
End: 2022-10-09

## 2022-10-17 ENCOUNTER — NON-APPOINTMENT (OUTPATIENT)
Age: 82
End: 2022-10-17

## 2022-12-05 ENCOUNTER — NON-APPOINTMENT (OUTPATIENT)
Age: 82
End: 2022-12-05

## 2022-12-05 ENCOUNTER — APPOINTMENT (OUTPATIENT)
Dept: CARDIOLOGY | Facility: CLINIC | Age: 82
End: 2022-12-05

## 2022-12-05 VITALS
HEIGHT: 67 IN | TEMPERATURE: 97.1 F | OXYGEN SATURATION: 98 % | BODY MASS INDEX: 28.56 KG/M2 | HEART RATE: 53 BPM | WEIGHT: 182 LBS | SYSTOLIC BLOOD PRESSURE: 140 MMHG | DIASTOLIC BLOOD PRESSURE: 70 MMHG

## 2022-12-05 DIAGNOSIS — R42 DIZZINESS AND GIDDINESS: ICD-10-CM

## 2022-12-05 DIAGNOSIS — R06.09 OTHER FORMS OF DYSPNEA: ICD-10-CM

## 2022-12-05 PROCEDURE — 99214 OFFICE O/P EST MOD 30 MIN: CPT

## 2022-12-05 PROCEDURE — 93000 ELECTROCARDIOGRAM COMPLETE: CPT

## 2022-12-05 RX ORDER — RAMIPRIL 5 MG/1
5 CAPSULE ORAL DAILY
Refills: 0 | Status: DISCONTINUED | COMMUNITY
End: 2022-12-05

## 2022-12-27 NOTE — HISTORY OF PRESENT ILLNESS
[FreeTextEntry1] : JOSE ALBERTO MENDEZ  is a 82 year old  M\par \par \par Concomitant cardiovascular care by Dr. Alejandro Martin at [facility]\par Also primary physician is Dr. Shin at [facility] and seen by electrophysiology at Dr. Geo Chaudhry as well as Dr. Neeraj Larry for long-standing hypertension. \par \par There is a history of coronary artery disease. In 1996, had angina and underwent cardiac catheterization at [facility], which was complicated by a left main dissection which subsequently led to two-vessel bypass surgery. He had a repeat cardiac catheterization in 2006 and underwent PCI to right coronary artery. There is no clinical history of a myocardial infarction. Also told of carotid atherosclerosis. No recent ischemic evaluation\par \par There is a long-standing history of hypertension since the age of 23 and particularly his blood pressure can be sensitive to increased sodium intake with associated development of peripheral edema for which he self adjusts torsemide dose when eating outside the home. There is no prior history of LV dysfunction. There is CRI, followed at [facility]. There is a history of microalbuminuria. \par \par h/o atrial tachycardia and atrial flutter, ? asymptomatic. He underwent an ablation procedure with Dr. Chaudhry. There is also a history of chronic PVCs as well as AV conduction disease. He is intolerant to beta-blocker therapy. Unaware of premature beats. \par \par There is no chest pain, no symptomatic palpitations, lightheadedness, dizziness, or syncope. \par \par in the interim he reports being diagnosed with high-grade meningioma \par he underwent resection he had subsequent paralysis \par he did acute rehab at [facility] then inpatient rehab and now does outpatient rehab at [facility] \par \par Last seen over 1 year ago last monitor was notable for ventricular ectopy and slow ventricular rates\par \par There is ongoing treatment regarding his metastatic disease.  He underwent further radiation.  As part of work-up by [facility] for transplant/dialysis he was recognized to have liver metastasis.  He has been involved in a clinical trial with interventional radiology at [facility].  As result of his cancer therapy he is much less active.  His gait has improved.  He continues to work with physical therapy.  He does not have his usual exercise tolerance.  There is increasing fatigue and shortness of breath.  \par \par He recently had a carotid study and monitor performed at [facility].\par \par Today's EKG demonstrates A. fib with slow ventricular rate right bundle branch block.  \par labs November 2021 with elevated BNP \par echocardiogram demonstrates normal left ventricular function mild to moderate mitral regurgitation moderate aortic stenosis \par outside note from Mateus July 2022 at last office visit with Mateus he was sent for repeat monitor \par \par electrophysiology report from June 2012. At that time underwent catheter ablation of typical isthmus-dependent right atrial flutter. There is acceptable baseline AV conduction noted. \par \par Cardiac catheterization report in May 2006 successful stent performed on 90% lesion in mid RCA. \par \par Nuclear stress test February 2013. Lino protocol. 9 minutes 50 seconds. Ischemic EKG response. Abnormal perfusion with mild fixed defects involving mid to distal anterior wall and apex. Mild to moderate fixed defects involving inferior wall.

## 2022-12-27 NOTE — ASSESSMENT
[FreeTextEntry1] : \par Recommend pharmacologic stress test.  Unable to ambulate on treadmill.  \par Echocardiogram to monitor aortic valve disease. \par \par Atrial Flutter: s/p RFA Chaudhry [facility]\par Baseline AV conduction disease\par PVC/NSVT: normal EF outflow tract?\par AF\par Speedy\par Elevated thromboembolic risk profile. Renally dosed Xarelto 15\par Intolerant of beta blocker\par continue anticoagulation\par rx sleep apnea.\par \par CAD: PCI BMS to L cxL main dissection then 2v CABG '96 (LIMA to LAD SVG to OM1) repeat PCI NEAL RCA '06.\par Prior stent and bypass grafting\par Daily aspirin and statin therapy.\par \par Hypertensive heart disease and renal insufficiency\par Proteinuria\par Follow-up with nephrology \par \par Atherosclerosis: complex atheroma carotid stenosis \par Abdominal ultrasound, risk factor modification as above. \par

## 2023-01-17 ENCOUNTER — APPOINTMENT (OUTPATIENT)
Dept: CARDIOLOGY | Facility: CLINIC | Age: 83
End: 2023-01-17
Payer: MEDICARE

## 2023-01-17 PROCEDURE — 93979 VASCULAR STUDY: CPT

## 2023-01-17 PROCEDURE — 93306 TTE W/DOPPLER COMPLETE: CPT

## 2023-01-17 PROCEDURE — 93015 CV STRESS TEST SUPVJ I&R: CPT

## 2023-01-17 PROCEDURE — A9502: CPT

## 2023-01-17 PROCEDURE — 78452 HT MUSCLE IMAGE SPECT MULT: CPT

## 2023-01-23 ENCOUNTER — APPOINTMENT (OUTPATIENT)
Dept: CARDIOLOGY | Facility: CLINIC | Age: 83
End: 2023-01-23
Payer: MEDICARE

## 2023-01-23 VITALS — SYSTOLIC BLOOD PRESSURE: 170 MMHG | DIASTOLIC BLOOD PRESSURE: 70 MMHG

## 2023-01-23 VITALS
BODY MASS INDEX: 28.56 KG/M2 | HEART RATE: 58 BPM | TEMPERATURE: 96.4 F | WEIGHT: 182 LBS | HEIGHT: 67 IN | DIASTOLIC BLOOD PRESSURE: 70 MMHG | SYSTOLIC BLOOD PRESSURE: 176 MMHG | OXYGEN SATURATION: 100 %

## 2023-01-23 PROCEDURE — 99214 OFFICE O/P EST MOD 30 MIN: CPT

## 2023-01-24 NOTE — ASSESSMENT
[FreeTextEntry1] : Symptoms may be related to conduction disease VHD\par will readdress TAVR evaluation after upcoming MRI and meningioma team evaluation\par Monitor will be applied reviewed indication for permanent pacemaker  \par \par Atrial Flutter: s/p RFA Chaudhry [facility]\par Baseline AV conduction disease\par PVC/NSVT: normal EF outflow tract?\par AF\par Speedy\par Elevated thromboembolic risk profile. Renally dosed Xarelto 15\par Intolerant of beta blocker\par continue anticoagulation\par rx sleep apnea.\par \par CAD: PCI BMS to L cxL main dissection then 2v CABG '96 (LIMA to LAD SVG to OM1) repeat PCI NEAL RCA '06.\par Prior stent and bypass grafting\par Daily aspirin and statin therapy.\par \par Hypertensive heart disease and renal insufficiency\par Proteinuria\par Follow-up with nephrology \par \par Atherosclerosis: complex atheroma carotid stenosis \par Abdominal ultrasound, risk factor modification as above.

## 2023-01-24 NOTE — HISTORY OF PRESENT ILLNESS
[FreeTextEntry1] : JOSE ALBERTO MENDEZ  is a 82 year old  M\par \par Concomitant cardiovascular care by Dr. Alejandro Martin at [facility]\par Also primary physician is Dr. Shin at [facility] and seen by electrophysiology at Dr. Geo Chaudhry as well as Dr. Neeraj Larry for long-standing hypertension. \par \par There is a history of coronary artery disease. \par In 1996, had angina and underwent cardiac catheterization at [facility], which was complicated by a left main dissection which subsequently led to two-vessel bypass surgery. \par He had a repeat cardiac catheterization in 2006 and underwent PCI to right coronary artery. \par There is no clinical history of a myocardial infarction. \par Also told of carotid atherosclerosis\par \par There is a long-standing history of hypertension since the age of 23 and particularly his blood pressure can be sensitive to increased sodium intake with associated development of peripheral edema for which he self adjusts torsemide dose when eating outside the home. \par There is no prior history of LV dysfunction. \par There is CRI, followed at [facility]. There is a history of microalbuminuria. \par \par h/o atrial tachycardia and atrial flutter, ? asymptomatic. \par He underwent an ablation procedure with Dr. Chaudhry. \par There is also a history of chronic PVCs as well as AV conduction disease.\par last monitor was notable for ventricular ectopy and slow ventricular rates \par He is intolerant to beta-blocker therapy. \par Unaware of premature beats. \par \par recently diagnosed with high-grade meningioma \par he underwent resection \par subsequent paralysis \par acute rehab at [facility] then inpatient rehab \par \par There is ongoing treatment regarding his metastatic disease. He underwent further radiation. \par As part of work-up by [facility] for transplant/dialysis he was recognized to have liver metastasis. \par He has been involved in a clinical trial with interventional radiology at [facility].\par As result of his cancer therapy he is much less active. \par \par His gait has improved.\par He continues to work with physical therapy. He does not have his usual exercise tolerance. \par There is increasing fatigue and shortness of breath\par Last blood work January 2023 has a creatinine of 3.0 hemoglobin 12.4 \par \par he is scheduled for a follow-up MRI \par \par There is no chest pain, no symptomatic palpitations, lightheadedness, dizziness, or syncope. \par \par nuclear stress test there is slow ventricular rates there was fixed defects and normal left ventricular function given aminophylline \par abdominal ultrasound has aortic atherosclerosis without aneurysm\par echocardiogram has normal left ventricular function with aortic stenosis \par EKG demonstrates A. fib with slow ventricular rate right bundle branch block. \par \par electrophysiology report from June 2012. At that time underwent catheter ablation of typical isthmus-dependent right atrial flutter. There is acceptable baseline AV conduction noted. \par Cardiac catheterization report in May 2006 successful stent performed on 90% lesion in mid RCA. \par

## 2023-02-07 ENCOUNTER — APPOINTMENT (OUTPATIENT)
Dept: CARDIOLOGY | Facility: CLINIC | Age: 83
End: 2023-02-07
Payer: MEDICARE

## 2023-02-07 PROCEDURE — 93244 EXT ECG>48HR<7D REV&INTERPJ: CPT

## 2023-06-19 ENCOUNTER — NON-APPOINTMENT (OUTPATIENT)
Age: 83
End: 2023-06-19

## 2023-06-19 ENCOUNTER — APPOINTMENT (OUTPATIENT)
Dept: CARDIOLOGY | Facility: CLINIC | Age: 83
End: 2023-06-19
Payer: MEDICARE

## 2023-06-19 VITALS — HEART RATE: 55 BPM | BODY MASS INDEX: 26.68 KG/M2 | OXYGEN SATURATION: 99 % | HEIGHT: 67 IN | WEIGHT: 170 LBS

## 2023-06-19 VITALS — DIASTOLIC BLOOD PRESSURE: 94 MMHG | SYSTOLIC BLOOD PRESSURE: 162 MMHG

## 2023-06-19 DIAGNOSIS — Z95.1 PRESENCE OF CORONARY ANGIOPLASTY IMPLANT AND GRAFT: ICD-10-CM

## 2023-06-19 DIAGNOSIS — Z01.810 ENCOUNTER FOR PREPROCEDURAL CARDIOVASCULAR EXAMINATION: ICD-10-CM

## 2023-06-19 DIAGNOSIS — I25.10 ATHEROSCLEROTIC HEART DISEASE OF NATIVE CORONARY ARTERY W/OUT ANGINA PECTORIS: ICD-10-CM

## 2023-06-19 DIAGNOSIS — I65.21 OCCLUSION AND STENOSIS OF RIGHT CAROTID ARTERY: ICD-10-CM

## 2023-06-19 DIAGNOSIS — Z95.5 PRESENCE OF CORONARY ANGIOPLASTY IMPLANT AND GRAFT: ICD-10-CM

## 2023-06-19 DIAGNOSIS — I13.10 HYPERTENSIVE HEART AND CHRONIC KIDNEY DISEASE W/OUT HEART FAILURE, WITH STAGE 1 THROUGH STAGE 4 CHRONIC KIDNEY DISEASE, OR UNSPECIFIED CHRONIC KIDNEY DISEASE: ICD-10-CM

## 2023-06-19 DIAGNOSIS — I49.3 VENTRICULAR PREMATURE DEPOLARIZATION: ICD-10-CM

## 2023-06-19 DIAGNOSIS — I70.0 ATHEROSCLEROSIS OF AORTA: ICD-10-CM

## 2023-06-19 DIAGNOSIS — I35.0 NONRHEUMATIC AORTIC (VALVE) STENOSIS: ICD-10-CM

## 2023-06-19 DIAGNOSIS — Z95.1 PRESENCE OF AORTOCORONARY BYPASS GRAFT: ICD-10-CM

## 2023-06-19 PROCEDURE — 93000 ELECTROCARDIOGRAM COMPLETE: CPT

## 2023-06-19 PROCEDURE — 99215 OFFICE O/P EST HI 40 MIN: CPT

## 2023-06-19 PROCEDURE — 93306 TTE W/DOPPLER COMPLETE: CPT

## 2023-06-19 RX ORDER — ESCITALOPRAM OXALATE 5 MG/1
5 TABLET, FILM COATED ORAL DAILY
Refills: 0 | Status: DISCONTINUED | COMMUNITY
End: 2023-06-19

## 2023-06-19 RX ORDER — TORSEMIDE 10 MG/1
10 TABLET ORAL DAILY
Refills: 0 | Status: DISCONTINUED | COMMUNITY
End: 2023-06-19

## 2023-06-22 NOTE — PHYSICAL EXAM

## 2023-06-22 NOTE — DISCUSSION/SUMMARY
[FreeTextEntry1] : JOSE ALBERTO MENDEZ is a 82 year old M who presents today Jun 19, 2023 with the above history and the following active issues:\par \par Preoperative cardiovascular examination.\par Patient may proceed with brain surgery - per patient report meningioma being removed from inner and outer skull.\par At present, there are no active cardiac conditions. \par No recent unstable coronary syndromes, decompensated heart failure, severe valvular heart disease or significant dysrhythmias.  \par Baseline functional status is acceptable/limited.    \par The clinical benefit of the proposed procedure outweighs the associated cardiovascular risk.  \par Risk not attenuated with further CV testing.  \par Prior testing as outlined above.\par Optimized from a cardiovascular perspective.\par Aspirin may be held one week prior to surgery, restart post op with surgery approval\par Hold Xarelto 3 days prior to surgery. 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 Renally dosed Xarelto 15\par Intolerant of beta blocker\par continue anticoagulation\par Now s/p PPM implant in [facility]. Most recent transmission requested.\par rx sleep apnea.\par \par CAD: PCI BMS to L cxL main dissection then 2v CABG '96 (LIMA to LAD SVG to OM1) repeat PCI NEAL RCA '06.\par Prior stent and bypass grafting\par Daily aspirin and statin therapy. Patient reminded he should be on ASA therapy.\par \par Hypertensive heart disease and renal insufficiency\par Proteinuria\par On Lisinopril and Torsemide. He will increase his Torsemide and trend BPs at home. Note elevation in the office today. RN team to call him Thursday for f/u BPs. Educated patient on low salt diet, alcohol intake in moderation, regular cardiovascular exercise, and weight reduction for improved BP control.\par Continue to monitor BP at home and call for persistently elevated readings (>140/90). \par Follow-up with nephrology \par \par Atherosclerosis: complex atheroma carotid stenosis \par \par \par Ongoing f/u with PCP.\par \par F/U: Seeing Dr. Martin at [facility] in the fall and will see Dr. Andino 12/2023.\par Discussed red flag symptoms, which would warrant sooner or emergent medical evaluation.\par Any questions and concerns were addressed and resolved.\par \par Sincerely,\par Lilo Diaz [facility]\par Patient's history, testing, and plan was reviewed with supervising physician, Dr. Patrick Valentino\par \par

## 2023-06-22 NOTE — HISTORY OF PRESENT ILLNESS
[FreeTextEntry1] : JOSE ALBERTO MENDEZ is a 82 year old male with a past medical history of:\par \par Concomitant cardiovascular care by Dr. Alejandro Martin at [facility]\par Also primary physician is Dr. Shin at [facility] and seen by electrophysiology at Dr. Geo Chaudhry as well as Dr. Neeraj Larry for long-standing hypertension. \par \par There is a history of coronary artery disease. \par In 1996, had angina and underwent cardiac catheterization at [facility], which was complicated by a left main dissection which subsequently led to two-vessel bypass surgery. \par He had a repeat cardiac catheterization in 2006 and underwent PCI to right coronary artery. \par There is no clinical history of a myocardial infarction. \par Also told of carotid atherosclerosis\par \par There is a long-standing history of hypertension since the age of 23 and particularly his blood pressure can be sensitive to increased sodium intake with associated development of peripheral edema for which he self adjusts torsemide dose when eating outside the home. \par There is no prior history of LV dysfunction. \par There is CRI, followed at [facility]. There is a history of microalbuminuria. \par \par h/o atrial tachycardia and atrial flutter, ? asymptomatic. \par He underwent an ablation procedure with Dr. Chaudhry. \par There is also a history of chronic PVCs as well as AV conduction disease.\par last monitor was notable for ventricular ectopy and slow ventricular rates \par He is intolerant to beta-blocker therapy. \par Unaware of premature beats. \par \par recently diagnosed with high-grade meningioma \par he underwent resection \par subsequent paralysis \par acute rehab at [facility] then inpatient rehab \par \par There is ongoing treatment regarding his metastatic disease. He underwent further radiation. \par As part of work-up by [facility] for transplant/dialysis he was recognized to have liver metastasis. \par He has been involved in a clinical trial with interventional radiology at [facility].\par As result of his cancer therapy he is much less active. \par \par \par \par Last seen 1/23/23. In the interim had PPM implanted at [facility]. Follows with Dr. Chaudhry. Also in the interim had gamma knife radiation. Presents today, 6/19/23, for cardiac clearance prior to brain surgery - per patient report meningioma being removed from inner and outer skull. Only report is some lightheadedness which he relates to his Baclofen medication. Edema is improving. Denies CP, SOB, palpitations, syncope, and claudication. Former smoker. Quit in 1996. Walks with walker.\par \par /90.\par \par \par Testing:\par \par EKG 6/19/23: Afib at 60 bpm, QTc 458 ms, RBBB, nonspecific ST-T wave abnormalities \par \par Zio 1/23/23-1/30/23: Persistent AV avg HR 51 bpm, known RBBB, nocturnal slow VR 28 bpm, duration not documented, x 1 symptom ~ 10 am rate 37-45 bpm, 4 beat abberrancy\par \par Labs 1/20/23: Na 135, K 5.1, Cr 2.95, Ca 8.8, AST 26, ALT 14, WBC 4.26, Hgb 12.4, HCT 40.6, plt 115, Chol 135, Trigs 138, HDL 44, LDL 63\par \par nuclear stress test 1/17/23: there is slow ventricular rates there was fixed defects and normal left ventricular function given aminophylline \par \par Echo 1/17/23: CONCLUSIONS:\par 1. Left ventricular ejection fraction is visually estimated at 55 to 60%.\par 2. No pericardial effusion seen.\par 3. Moderate-to-severe aortic stenosis.\par 4. Compared to a prior transthoracicreport from 11/11/2021, progression of AS is noted.\par \par Abd u/s 1/17/23: CONCLUSIONS:\par 1. No prior exam is available for comparison.\par 2. No evidence of abdominal aortic aneurysm.\par \par \par EKG demonstrates A. fib with slow ventricular rate right bundle branch block. \par \par Carotid u/s 8/4/21: TDS. The right internal carotid artery has heterogeneous plaque resulting in a 50-69% sproximal stenosis on spectal doppler analysis. The left internal carotid artery has heterogenous plaque resultin in a < 50% prox stenosis on spectral doppler analysis.\par \par electrophysiology report from June 2012. At that time underwent catheter ablation of typical isthmus-dependent \par right atrial flutter. There is acceptable baseline AV conduction noted. \par \par Cardiac catheterization report in May 2006 successful stent performed on 90% lesion in mid RCA. \par

## 2023-06-23 ENCOUNTER — NON-APPOINTMENT (OUTPATIENT)
Age: 83
End: 2023-06-23

## 2023-06-27 ENCOUNTER — NON-APPOINTMENT (OUTPATIENT)
Age: 83
End: 2023-06-27

## 2024-01-19 NOTE — HISTORY OF PRESENT ILLNESS
[FreeTextEntry1] : JOSE ALBERTO MENDEZ  is a 83 year old  M  Concomitant cardiovascular care by Dr. Alejandro Martin at [facility] Also primary physician is Dr. Shin at [facility] and seen by electrophysiology at Dr. Geo Chaudhry as well as Dr. Neeraj Larry for long-standing hypertension.   There is a history of coronary artery disease.  In 1996, had angina and underwent cardiac catheterization at [facility], which was complicated by a left main dissection which subsequently led to two-vessel bypass surgery.  He had a repeat cardiac catheterization in 2006 and underwent PCI to right coronary artery.  There is no clinical history of a myocardial infarction.  Also told of carotid atherosclerosis  There is a long-standing history of hypertension since the age of 23 and particularly his blood pressure can be sensitive to increased sodium intake with associated development of peripheral edema for which he self adjusts torsemide dose when eating outside the home.  There is no prior history of LV dysfunction.  There is CRI, followed at [facility]. There is a history of microalbuminuria.   h/o atrial tachycardia and atrial flutter, ? asymptomatic.  He underwent an ablation procedure with Dr. Chaudhry.  There is also a history of chronic PVCs as well as AV conduction disease. last monitor was notable for ventricular ectopy and slow ventricular rates  He is intolerant to beta-blocker therapy.  Unaware of premature beats.   recently diagnosed with high-grade meningioma  he underwent resection  subsequent paralysis  acute rehab at [facility] then inpatient rehab   There is ongoing treatment regarding his metastatic disease. He underwent further radiation.  As part of work-up by [facility] for transplant/dialysis he was recognized to have liver metastasis.  He has been involved in a clinical trial with interventional radiology at [facility]. As result of his cancer therapy he is much less active.     Last seen 1/23/23. In the interim had PPM implanted at [facility]. Follows with Dr. Chaudhry. Also in the interim had gamma knife radiation. Presents today, 6/19/23, for cardiac clearance prior to brain surgery - per patient report meningioma being removed from inner and outer skull. Only report is some lightheadedness which he relates to his Baclofen medication. Edema is improving. Denies CP, SOB, palpitations, syncope, and claudication. Former smoker. Quit in 1996. Walks with walker.  /90.   Testing:  EKG 6/19/23: Afib at 60 bpm, QTc 458 ms, RBBB, nonspecific ST-T wave abnormalities   Zio 1/23/23-1/30/23: Persistent AV avg HR 51 bpm, known RBBB, nocturnal slow VR 28 bpm, duration not documented, x 1 symptom ~ 10 am rate 37-45 bpm, 4 beat abberrancy  Labs 1/20/23: Na 135, K 5.1, Cr 2.95, Ca 8.8, AST 26, ALT 14, WBC 4.26, Hgb 12.4, HCT 40.6, plt 115, Chol 135, Trigs 138, HDL 44, LDL 63  nuclear stress test 1/17/23: there is slow ventricular rates there was fixed defects and normal left ventricular function given aminophylline   Echo 1/17/23: CONCLUSIONS: 1. Left ventricular ejection fraction is visually estimated at 55 to 60%. 2. No pericardial effusion seen. 3. Moderate-to-severe aortic stenosis. 4. Compared to a prior transthoracicreport from 11/11/2021, progression of AS is noted.  Abd u/s 1/17/23: CONCLUSIONS: 1. No prior exam is available for comparison. 2. No evidence of abdominal aortic aneurysm.   EKG demonstrates A. fib with slow ventricular rate right bundle branch block.   Carotid u/s 8/4/21: TDS. The right internal carotid artery has heterogeneous plaque resulting in a 50-69% sproximal stenosis on spectal doppler analysis. The left internal carotid artery has heterogenous plaque resultin in a < 50% prox stenosis on spectral doppler analysis.  electrophysiology report from June 2012. At that time underwent catheter ablation of typical isthmus-dependent  right atrial flutter. There is acceptable baseline AV conduction noted.   Cardiac catheterization report in May 2006 successful stent performed on 90% lesion in mid RCA.   Preoperative cardiovascular examination.  Patient may proceed with brain surgery - per patient report meningioma being removed from inner and outer skull.  At present, there are no active cardiac conditions.  No recent unstable coronary syndromes, decompensated heart failure, severe valvular heart disease or significant dysrhythmias.  Baseline functional status is acceptable/limited.  The clinical benefit of the proposed procedure outweighs the associated cardiovascular risk.  Risk not attenuated with further CV testing.  Prior testing as outlined above.  Optimized from a cardiovascular perspective.  Aspirin may be held one week prior to surgery, restart post op with surgery approval  Hold Xarelto 3 days prior to surgery. Resume ASAP per surgeon's discretion.  DVT ppx      Symptoms may be related to conduction disease VHD  will readdress TAVR evaluation after upcoming MRI and meningioma team evaluation    Atrial Flutter: s/p RFA [facility]  Baseline AV conduction disease  PVC/NSVT: normal EF outflow tract?  AF  Speedy  Elevated thromboembolic risk profile. Renally dosed Xarelto 15  Intolerant of beta blocker  continue anticoagulation  Now s/p PPM implant [facility]. Most recent transmission requested.  rx sleep apnea.    CAD: PCI BMS to L cxL main dissection then 2v CABG '96 (LIMA to LAD SVG to OM1) repeat PCI NEAL RCA '06.  Prior stent and bypass grafting  Daily aspirin and statin therapy. Patient reminded he should be on ASA therapy.    Hypertensive heart disease and renal insufficiency  Proteinuria  On Lisinopril and Torsemide. He will increase his Torsemide and trend BPs at home. Note elevation in the office today. RN team to call him Thursday for f/u BPs. Educated patient on low salt diet, alcohol intake in moderation, regular cardiovascular exercise, and weight reduction for improved BP control.  Continue to monitor BP at home and call for persistently elevated readings (>140/90).  Follow-up with nephrology    Atherosclerosis: complex atheroma carotid stenosis      Ongoing f/u with PCP.    F[facility] and will see Dr. Andino 12/2023.  Discussed red flag symptoms, which would warrant sooner or emergent medical evaluation.  Any questions and concerns were addressed and resolved.

## 2024-01-19 NOTE — DISCUSSION/SUMMARY
[FreeTextEntry1] : JOSE ALBERTO MENDEZ is a 82 year old M who presents today Jun 19, 2023 with the above history and the following active issues:\par  \par  Preoperative cardiovascular examination.\par  Patient may proceed with brain surgery - per patient report meningioma being removed from inner and outer skull.\par  At present, there are no active cardiac conditions. \par  No recent unstable coronary syndromes, decompensated heart failure, severe valvular heart disease or significant dysrhythmias.  \par  Baseline functional status is acceptable/limited.    \par  The clinical benefit of the proposed procedure outweighs the associated cardiovascular risk.  \par  Risk not attenuated with further CV testing.  \par  Prior testing as outlined above.\par  Optimized from a cardiovascular perspective.\par  Aspirin may be held one week prior to surgery, restart post op with surgery approval\par  Hold Xarelto 3 days prior to surgery. Resume ASAP per surgeon's discretion.\par  DVT ppx\par  \par  \par  Symptoms may be related to conduction disease VHD\par  will readdress TAVR evaluation after upcoming MRI and meningioma team evaluation\par  \par  Atrial Flutter: s/p RFA [facility]\par  Baseline AV conduction disease\par  PVC/NSVT: normal EF outflow tract?\par  AF\par  Speedy\par  Elevated thromboembolic risk profile. Renally dosed Xarelto 15\par  Intolerant of beta blocker\par  continue anticoagulation\par  Now s/p PPM implant in [facility]. Most recent transmission requested.\par  rx sleep apnea.\par  \par  CAD: PCI BMS to L cxL main dissection then 2v CABG '96 (LIMA to LAD SVG to OM1) repeat PCI NEAL RCA '06.\par  Prior stent and bypass grafting\par  Daily aspirin and statin therapy. Patient reminded he should be on ASA therapy.\par  \par  Hypertensive heart disease and renal insufficiency\par  Proteinuria\par  On Lisinopril and Torsemide. He will increase his Torsemide and trend BPs at home. Note elevation in the office today. RN team to call him Thursday for f/u BPs. Educated patient on low salt diet, alcohol intake in moderation, regular cardiovascular exercise, and weight reduction for improved BP control.\par  Continue to monitor BP at home and call for persistently elevated readings (>140/90). \par  Follow-up with nephrology \par  \par  Atherosclerosis: complex atheroma carotid stenosis \par  \par  \par  Ongoing f/u with PCP.\par  \par  F/U: Seeing Dr. Martin at [facility] in the fall and will see Dr. Andino 12/2023.\par  Discussed red flag symptoms, which would warrant sooner or emergent medical evaluation.\par  Any questions and concerns were addressed and resolved.\par  \par  Sincerely,\par  Lilo Diaz [facility]-BC\par  Patient's history, testing, and plan was reviewed with supervising physician, Dr. Patrick Valentino\par  \par

## 2024-01-22 ENCOUNTER — APPOINTMENT (OUTPATIENT)
Dept: CARDIOLOGY | Facility: CLINIC | Age: 84
End: 2024-01-22

## 2024-06-03 ENCOUNTER — APPOINTMENT (OUTPATIENT)
Dept: GERIATRICS | Facility: ASSISTED LIVING FACILITY | Age: 84
End: 2024-06-03
Payer: MEDICARE

## 2024-06-03 VITALS
SYSTOLIC BLOOD PRESSURE: 128 MMHG | DIASTOLIC BLOOD PRESSURE: 68 MMHG | HEART RATE: 56 BPM | OXYGEN SATURATION: 98 % | TEMPERATURE: 98 F | WEIGHT: 184 LBS | BODY MASS INDEX: 28.82 KG/M2 | RESPIRATION RATE: 14 BRPM

## 2024-06-03 DIAGNOSIS — K21.9 GASTRO-ESOPHAGEAL REFLUX DISEASE W/OUT ESOPHAGITIS: ICD-10-CM

## 2024-06-03 DIAGNOSIS — R41.3 OTHER AMNESIA: ICD-10-CM

## 2024-06-03 DIAGNOSIS — G47.00 INSOMNIA, UNSPECIFIED: ICD-10-CM

## 2024-06-03 DIAGNOSIS — R56.9 UNSPECIFIED CONVULSIONS: ICD-10-CM

## 2024-06-03 DIAGNOSIS — E44.0 MODERATE PROTEIN-CALORIE MALNUTRITION: ICD-10-CM

## 2024-06-03 DIAGNOSIS — Z71.89 OTHER SPECIFIED COUNSELING: ICD-10-CM

## 2024-06-03 DIAGNOSIS — L20.84 INTRINSIC (ALLERGIC) ECZEMA: ICD-10-CM

## 2024-06-03 DIAGNOSIS — D50.9 IRON DEFICIENCY ANEMIA, UNSPECIFIED: ICD-10-CM

## 2024-06-03 DIAGNOSIS — E78.5 HYPERLIPIDEMIA, UNSPECIFIED: ICD-10-CM

## 2024-06-03 DIAGNOSIS — E55.9 VITAMIN D DEFICIENCY, UNSPECIFIED: ICD-10-CM

## 2024-06-03 DIAGNOSIS — E53.8 DEFICIENCY OF OTHER SPECIFIED B GROUP VITAMINS: ICD-10-CM

## 2024-06-03 DIAGNOSIS — J30.9 ALLERGIC RHINITIS, UNSPECIFIED: ICD-10-CM

## 2024-06-03 DIAGNOSIS — I48.3 TYPICAL ATRIAL FLUTTER: ICD-10-CM

## 2024-06-03 PROCEDURE — 99344 HOME/RES VST NEW MOD MDM 60: CPT

## 2024-06-03 RX ORDER — POLYETHYLENE GLYCOL 3350 17 G/17G
17 POWDER, FOR SOLUTION ORAL TWICE DAILY
Qty: 30 | Refills: 2 | Status: ACTIVE | COMMUNITY
Start: 2024-06-03

## 2024-06-03 RX ORDER — LEVOTHYROXINE SODIUM 88 UG/1
88 TABLET ORAL DAILY
Refills: 0 | Status: DISCONTINUED | COMMUNITY
End: 2024-06-03

## 2024-06-03 RX ORDER — LISINOPRIL 10 MG/1
10 TABLET ORAL DAILY
Refills: 0 | Status: DISCONTINUED | COMMUNITY
End: 2024-06-03

## 2024-06-03 RX ORDER — LISINOPRIL 10 MG/1
10 TABLET ORAL
Qty: 90 | Refills: 4 | Status: ACTIVE | COMMUNITY
Start: 2024-06-03

## 2024-06-03 RX ORDER — RIVAROXABAN 15 MG/1
15 TABLET, FILM COATED ORAL
Qty: 90 | Refills: 1 | Status: ACTIVE | COMMUNITY
Start: 2024-06-03

## 2024-06-03 RX ORDER — HYDROCORTISONE 0.5 G/100G
0.5 CREAM TOPICAL
Refills: 0 | Status: ACTIVE | COMMUNITY
Start: 2024-06-03

## 2024-06-03 RX ORDER — ADHESIVE TAPE 3"X 2.3 YD
50 MCG TAPE, NON-MEDICATED TOPICAL DAILY
Refills: 0 | Status: DISCONTINUED | COMMUNITY
End: 2024-06-03

## 2024-06-03 RX ORDER — TAMSULOSIN HYDROCHLORIDE 0.4 MG/1
0.4 CAPSULE ORAL
Qty: 90 | Refills: 3 | Status: ACTIVE | COMMUNITY
Start: 2024-06-03

## 2024-06-03 RX ORDER — SENNOSIDES 8.6 MG TABLETS 8.6 MG/1
8.6 TABLET ORAL
Qty: 60 | Refills: 3 | Status: ACTIVE | COMMUNITY
Start: 2024-06-03

## 2024-06-03 RX ORDER — RIVAROXABAN 15 MG/1
15 TABLET, FILM COATED ORAL DAILY
Qty: 90 | Refills: 3 | Status: DISCONTINUED | COMMUNITY
Start: 2018-06-22 | End: 2024-06-03

## 2024-06-03 RX ORDER — LEVOTHYROXINE SODIUM 0.09 MG/1
88 TABLET ORAL DAILY
Qty: 90 | Refills: 1 | Status: ACTIVE | COMMUNITY
Start: 2024-06-03

## 2024-06-03 RX ORDER — SILDENAFIL CITRATE 50 MG/1
50 TABLET, FILM COATED ORAL
Refills: 0 | Status: DISCONTINUED | COMMUNITY
End: 2024-06-03

## 2024-06-03 RX ORDER — LANSOPRAZOLE 15 MG/1
15 CAPSULE, DELAYED RELEASE ORAL
Refills: 0 | Status: DISCONTINUED | COMMUNITY
End: 2024-06-03

## 2024-06-03 RX ORDER — DOXAZOSIN 1 MG/1
1 TABLET ORAL
Refills: 0 | Status: ACTIVE | COMMUNITY
Start: 2024-06-03

## 2024-06-03 RX ORDER — ADHESIVE BANDAGE 3/4"
BANDAGE TOPICAL
Refills: 0 | Status: ACTIVE | COMMUNITY
Start: 2024-06-03

## 2024-06-03 RX ORDER — LORAZEPAM 1 MG/1
1 TABLET ORAL
Refills: 0 | Status: ACTIVE | COMMUNITY
Start: 2024-06-03

## 2024-06-03 RX ORDER — ICOSAPENT ETHYL 1 G/1
1 CAPSULE ORAL AT BEDTIME
Refills: 0 | Status: ACTIVE | COMMUNITY
Start: 2024-06-03

## 2024-06-03 RX ORDER — PANTOPRAZOLE 40 MG/1
40 TABLET, DELAYED RELEASE ORAL
Qty: 30 | Refills: 3 | Status: ACTIVE | COMMUNITY
Start: 2024-06-03

## 2024-06-03 RX ORDER — ICOSAPENT ETHYL 1000 MG/1
1 CAPSULE ORAL DAILY
Refills: 0 | Status: DISCONTINUED | COMMUNITY
End: 2024-06-03

## 2024-06-03 RX ORDER — ROSUVASTATIN CALCIUM 10 MG/1
10 TABLET, FILM COATED ORAL DAILY
Refills: 0 | Status: DISCONTINUED | COMMUNITY
End: 2024-06-03

## 2024-06-03 RX ORDER — GLUCOSAMINE HCL/CHONDROITIN SU 500-400 MG
3 CAPSULE ORAL
Qty: 90 | Refills: 3 | Status: ACTIVE | COMMUNITY
Start: 2024-06-03 | End: 1900-01-01

## 2024-06-03 RX ORDER — ROSUVASTATIN CALCIUM 10 MG/1
10 TABLET, FILM COATED ORAL
Qty: 90 | Refills: 2 | Status: ACTIVE | COMMUNITY
Start: 2024-06-03

## 2024-06-03 RX ORDER — ALLOPURINOL 100 MG/1
100 TABLET ORAL DAILY
Refills: 0 | Status: ACTIVE | COMMUNITY

## 2024-06-05 ENCOUNTER — LABORATORY RESULT (OUTPATIENT)
Age: 84
End: 2024-06-05

## 2024-06-10 RX ORDER — CLOBAZAM 20 MG/1
20 TABLET ORAL
Refills: 0 | Status: ACTIVE | COMMUNITY
Start: 2024-06-03

## 2024-06-10 RX ORDER — CLOBAZAM 10 MG/1
10 TABLET ORAL
Refills: 0 | Status: ACTIVE | COMMUNITY
Start: 2024-06-03

## 2024-06-10 RX ORDER — BRIVARACETAM 100 MG/1
100 TABLET, FILM COATED ORAL
Refills: 0 | Status: ACTIVE | COMMUNITY
Start: 2024-06-03

## 2024-06-14 ENCOUNTER — APPOINTMENT (OUTPATIENT)
Dept: GERIATRICS | Facility: ASSISTED LIVING FACILITY | Age: 84
End: 2024-06-14

## 2024-06-14 VITALS — RESPIRATION RATE: 14 BRPM

## 2024-06-14 DIAGNOSIS — I48.91 UNSPECIFIED ATRIAL FIBRILLATION: ICD-10-CM

## 2024-06-14 DIAGNOSIS — G40.909 EPILEPSY, UNSPECIFIED, NOT INTRACTABLE, W/OUT STATUS EPILEPTICUS: ICD-10-CM

## 2024-06-14 DIAGNOSIS — F32.A DEPRESSION, UNSPECIFIED: ICD-10-CM

## 2024-06-14 DIAGNOSIS — N18.9 CHRONIC KIDNEY DISEASE, UNSPECIFIED: ICD-10-CM

## 2024-06-14 DIAGNOSIS — E03.9 HYPOTHYROIDISM, UNSPECIFIED: ICD-10-CM

## 2024-06-14 DIAGNOSIS — G81.91 HEMIPLEGIA, UNSPECIFIED AFFECTING RIGHT DOMINANT SIDE: ICD-10-CM

## 2024-06-14 DIAGNOSIS — N40.0 BENIGN PROSTATIC HYPERPLASIA WITHOUT LOWER URINARY TRACT SYMPMS: ICD-10-CM

## 2024-06-14 DIAGNOSIS — I10 ESSENTIAL (PRIMARY) HYPERTENSION: ICD-10-CM

## 2024-06-14 DIAGNOSIS — I25.10 ATHEROSCLEROTIC HEART DISEASE OF NATIVE CORONARY ARTERY W/OUT ANGINA PECTORIS: ICD-10-CM

## 2024-06-14 DIAGNOSIS — C70.9 MALIGNANT NEOPLASM OF MENINGES, UNSPECIFIED: ICD-10-CM

## 2024-06-14 DIAGNOSIS — R32 UNSPECIFIED URINARY INCONTINENCE: ICD-10-CM

## 2024-06-14 DIAGNOSIS — K59.00 CONSTIPATION, UNSPECIFIED: ICD-10-CM

## 2024-06-14 LAB
ALBUMIN SERPL ELPH-MCNC: 3.4 G/DL
ALP BLD-CCNC: 184 U/L
ALT SERPL-CCNC: 14 U/L
ANION GAP SERPL CALC-SCNC: 14 MMOL/L
AST SERPL-CCNC: 15 U/L
BASOPHILS # BLD AUTO: 0.04 K/UL
BASOPHILS NFR BLD AUTO: 0.8 %
BILIRUB SERPL-MCNC: 0.2 MG/DL
BUN SERPL-MCNC: 82 MG/DL
CALCIUM SERPL-MCNC: 8.7 MG/DL
CHLORIDE SERPL-SCNC: 106 MMOL/L
CO2 SERPL-SCNC: 19 MMOL/L
CREAT SERPL-MCNC: 2.85 MG/DL
EGFR: 22 ML/MIN/1.73M2
EOSINOPHIL # BLD AUTO: 0.19 K/UL
EOSINOPHIL NFR BLD AUTO: 4.3 %
GLUCOSE SERPL-MCNC: 80 MG/DL
HCT VFR BLD CALC: 29 %
HGB BLD-MCNC: 9.3 G/DL
LYMPHOCYTES # BLD AUTO: 0.57 K/UL
LYMPHOCYTES NFR BLD AUTO: 12.8 %
MAN DIFF?: NORMAL
MCHC RBC-ENTMCNC: 30.6 PG
MCHC RBC-ENTMCNC: 32.1 GM/DL
MCV RBC AUTO: 95.4 FL
MONOCYTES # BLD AUTO: 0.19 K/UL
MONOCYTES NFR BLD AUTO: 4.3 %
NEUTROPHILS # BLD AUTO: 3.49 K/UL
NEUTROPHILS NFR BLD AUTO: 77.8 %
PLATELET # BLD AUTO: 111 K/UL
POTASSIUM SERPL-SCNC: 5.4 MMOL/L
PROT SERPL-MCNC: 5.7 G/DL
RBC # BLD: 3.04 M/UL
RBC # FLD: 14.3 %
SODIUM SERPL-SCNC: 139 MMOL/L
TSH SERPL-ACNC: 2.14 UIU/ML
WBC # FLD AUTO: 4.49 K/UL

## 2024-06-14 PROCEDURE — 99349 HOME/RES VST EST MOD MDM 40: CPT

## 2024-06-14 PROCEDURE — G2211 COMPLEX E/M VISIT ADD ON: CPT | Mod: NC

## 2024-06-14 RX ORDER — ESCITALOPRAM OXALATE 20 MG/1
20 TABLET, FILM COATED ORAL
Refills: 0 | Status: ACTIVE | COMMUNITY
Start: 2024-06-03

## 2024-06-14 NOTE — HISTORY OF PRESENT ILLNESS
[No falls in past year] : Patient reported no falls in the past year [Completely Dependent] : Completely dependent. [Full assistance needed] : Assistance needed managing medications [] : Assistance needed managing finances. [Wheelchair] : wheelchair [Hospital Bed] : hospital bed [Smoke Detector] : smoke detector [Carbon Monoxide Detector] : carbon monoxide detector [Grab Bars] : grab bars [Night Light] : night light [Anti-Slip Measures] : anti-slip measures [0] : 2) Feeling down, depressed, or hopeless: Not at all (0) [PHQ-2 Negative - No further assessment needed] : PHQ-2 Negative - No further assessment needed [With Patient/Caregiver] : , with patient/caregiver [Reviewed no changes] : Reviewed, no changes [FreeTextEntry1] : JOSE ALBERTO MENDEZ is an 84 yo man with PMH of metastatic meningioma (mets to the liver), seizures disorder, right sided hemiparesis, atrial tachycardia/atrial flutteron Xarelto), CAD (s/p CABG and PPM), hypothyroidism, HTN, HLD, CKDV, BPH, iron deficiency anemia and depression who presents for initial evaluation at [address].  Patient moved to [address] on 5/31/24.  PCP: Dr. Abhishek Shin [phone] Cards: Dr. Alejandro Martin [phone]/ HTN clinic: Dr. Shon Larry [phone] Neuro: Dr. Mulugeta Cardona [phone] Neuro (epilepsy): Dr. Yolande Villasenor [phone] Neuro sx: Dr. Buck Gibbons [phone] Radiation oncology: Dr. Abbe Almanza [phone] IR: Dr. Hector Kennedy [phone] Nephro: [facility]   Patient is highly complex and is being managed closely by care team at [facility]. He was recently hospitalized at [facility] for severe seizure and new right sided hemiparesis. Patient was at [facility] and was discharged directly to [facility] on 5/31/24. Patient is planning to continue following with current PCP and various specialists. [facility] team will function as geriatric consultants.  Metastatic meningioma (mets to liver):  -first diagnosed in 2018; meningioma surgically resected by Dr. Gibbons (neurosx [facility]); wife states this went well and patient was able to function independently after this surgery  -in 2020, meningioma recurred. Underwent another surgical resection but could not remove everything. Has been subsequently undergoing Gamma Knife radiosurgery with Dr. Almanza (radiation oncology) since then; since 2020, patient has been able to ambulate with rollator -patient has mets to the liver and has had several SIRT procedures with Dr. Kennedy (IR)  Seizure disorder/Clonus: -complication of meningioma -in 9/2023 patient had first severe seizure and was hospitalized at [facility]/ managed by outpatient neuro/epilepsy team -on 4/2024, patient had another severe seizure and developed right hemiparesis -he is currently ordered for Briviact 200mg qd, clobazam 10mg qam, clobazam 20mg qpm, and lorazepam 1mg q8hrs prn  -has appt with Dr. Yolande Villasenor (neuro) tomorrow  Right hemiparesis:  -since 4/2024 subsequently following seizure  -patient requires daphney lift for transfers -incontinent of urine and stool- uses condom catheter -able to move left side however is very weak -needs PT order  Memory impairment: -most notable since 2020 when seizures began, worse since recent hospitalization in 4/2024 -needs assistance with all ADLs/IADLs  Insomnia: -stable -on melatonin 3mg qhs   Depression: -on lexapro 10mg qd  HTN: -long standing history since he was 24 yo -on lisinopril 10 mg qd, doxazosin 1mg qd, torsemide 10mg qd  -follows with cards  HLD: -on vascepa 2gm qhs and rosuvastatin 10mg qhs  CAD: -h/o left main dissection s/p 2-vessel bypass sx in 1996 -cardiac cath in 2006 s/p PCI to right coronary artery -on xarelto and statin  Iron deficiency anemia: -on daily iron supplementation  Atrial tachycardia/atrial flutter: -s/p ablation -intolerant to BB therapy -s/p PPM -on xarelto 15mg qd  CKD: -states Stage V -follows at [facility] -states baseline Cr ~3  Hypothyroidism: -on levothyroxine 88mcg qd  BPH: -on doxazosin 1mg qd and tamsulosin 0.4mg qd  Gout: -no recent flares -on allopurinol 200mg qd  Constipation: -on miralax bid and senna qhs -was requiring enemas at times during hospitalization  ACP: -wife states that they have completed HCP. Primary HCP is Jud Zuniga (wife)  (C) [phone]/  H)[phone]. Alternate HCP is daughter, Peyton Franklin [phone] -also has POA and living will  -no MOLST in place  Social history: -Jud is 2nd marraige -2 daughters from 1st marraige -worked in finance -loved to sail/ go to the theater  Immunizations: need records COVID-19: Flu: Pna: Shingles: Tdap:     [FreeTextEntry7] : need records [Guns at Home] : no guns at home [Stair Lift] : no stair lift used in home [Driving Concerns] : not driving or driving without noted concerns [de-identified] : 0 [de-identified] : 0 [de-identified] : Joi lift for transfers [[id number]] : 0 [AdvancecareDate] : 06/24 [FreeTextEntry4] : -wife states that they have completed HCP. Primary HCP is Jud Zuniga (wife)  (C) [phone]/  (h)[phone]. Alternate HCP is daughter, Peyton Franklin [phone]- advised to email to office or bring copy to [place] -also has POA and living will  -no MOLST in place- opened discussion today; wife would like to have family meeting with both daughters to further discuss. Patient to remain full code for now.

## 2024-06-14 NOTE — REASON FOR VISIT
HPI     Eye Problem    Additional comments: Left Eye.            Comments   Pt states OS is feeling much better.   Now just feels scratchy, like   something is in it.  Using drops regularly.     Fortified Tobramycin q1h OS  Cefazolin q1h OS        Last edited by Stephani Hoang on 8/4/2017 10:48 AM. (History)            Assessment /Plan     For exam results, see Encounter Report.    Corneal ulcer, left      CTL related, much improved.  Still with stromal keratitis diffusely.  ABX 2-3hrs, may start steroids soon if keratitis does not resolve.                    [Initial Evaluation] : an initial evaluation [Family Member] : family member [FreeTextEntry3] : wife, Jud and daughter, Henny

## 2024-06-14 NOTE — REVIEW OF SYSTEMS
[Feeling Tired] : feeling tired [Constipation] : constipation [Incontinence] : incontinence [Confused] : confusion [As Noted in HPI] : as noted in HPI [Anxiety] : anxiety [Depression] : depression [Negative] : Heme/Lymph [Fever] : no fever [Chills] : no chills [Discharge From Eyes] : no purulent discharge from the eyes [Dry Eyes] : no dryness of the eyes [Nasal Discharge] : no nasal discharge [Sore Throat] : no sore throat [Chest Pain] : no chest pain [Palpitations] : no palpitations [Shortness Of Breath] : no shortness of breath [Wheezing] : no wheezing [Cough] : no cough [SOB on Exertion] : no shortness of breath during exertion [Abdominal Pain] : no abdominal pain [Vomiting] : no vomiting [Diarrhea] : no diarrhea [Dysuria] : no dysuria [Limb Pain] : no limb pain [Skin Wound] : no skin wound [Dizziness] : no dizziness [Suicidal] : not suicidal

## 2024-06-14 NOTE — PHYSICAL EXAM
[No Acute Distress] : in no acute distress [Sclera] : the sclera and conjunctiva were normal [EOMI] : extraocular movements were intact [PERRL] : pupils were equal in size, round, and reactive to light [Normal Oral Mucosa] : normal oral mucosa [Normal Outer Ear/Nose] : the ears and nose were normal in appearance [Normal Appearance] : the appearance of the neck was normal [Supple] : the neck was supple [No Respiratory Distress] : no respiratory distress [No Acc Muscle Use] : no accessory muscle use [Respiration, Rhythm And Depth] : normal respiratory rhythm and effort [Auscultation Breath Sounds / Voice Sounds] : lungs were clear to auscultation bilaterally [Normal S1, S2] : normal S1 and S2 [Heart Rate And Rhythm] : heart rate was normal and rhythm regular [Edema] : edema was not present [Pedal Pulses Normal] : the pedal pulses are present [Bowel Sounds] : normal bowel sounds [Abdomen Tenderness] : non-tender [Abdomen Soft] : soft [Cervical Lymph Nodes Enlarged Posterior Bilaterally] : posterior cervical [Cervical Lymph Nodes Enlarged Anterior Bilaterally] : anterior cervical, supraclavicular [No CVA Tenderness] : no CVA  tenderness [Normal Color / Pigmentation] : normal skin color and pigmentation [Normal Affect] : the affect was normal [Oriented to Person] : oriented to person [Oriented to Place] : oriented to place [Normal Gait] : abnormal gait [Oriented to Time] : disoriented to time [de-identified] : sleeping intermittently during visit [de-identified] : right sided hemiparesis [de-identified] : right sided hemiparesis

## 2024-06-14 NOTE — ASSESSMENT
[FreeTextEntry1] : Need for further GOC conversation given complexity/patient acuity. Plan to discuss and complete MOLST next week with wife and daughters.  Asked to forward notes/ blood work from various specialists. Baseline labs ordered today.  HUBER Vences-BC

## 2024-06-16 ENCOUNTER — LABORATORY RESULT (OUTPATIENT)
Age: 84
End: 2024-06-16

## 2024-06-16 RX ORDER — GINKGO BILOBA LEAF EXTRACT 120 MG
250 CAPSULE ORAL
Qty: 90 | Refills: 1 | Status: ACTIVE | COMMUNITY
Start: 2024-06-03 | End: 1900-01-01

## 2024-06-17 ENCOUNTER — LABORATORY RESULT (OUTPATIENT)
Age: 84
End: 2024-06-17

## 2024-06-18 ENCOUNTER — LABORATORY RESULT (OUTPATIENT)
Age: 84
End: 2024-06-18

## 2024-06-19 ENCOUNTER — LABORATORY RESULT (OUTPATIENT)
Age: 84
End: 2024-06-19

## 2024-06-19 ENCOUNTER — APPOINTMENT (OUTPATIENT)
Dept: GERIATRICS | Facility: ASSISTED LIVING FACILITY | Age: 84
End: 2024-06-19

## 2024-06-19 PROBLEM — I25.10 CAD (CORONARY ARTERY DISEASE): Status: ACTIVE | Noted: 2024-06-03

## 2024-06-19 PROBLEM — E03.9 HYPOTHYROIDISM: Status: ACTIVE | Noted: 2017-10-19

## 2024-06-19 PROBLEM — K59.00 CONSTIPATION: Status: ACTIVE | Noted: 2024-06-03

## 2024-06-19 PROBLEM — I48.91 ATRIAL FIBRILLATION: Status: ACTIVE | Noted: 2018-07-09

## 2024-06-19 PROBLEM — G81.91 RIGHT HEMIPLEGIA: Status: ACTIVE | Noted: 2024-06-03

## 2024-06-19 PROBLEM — F32.A DEPRESSION: Status: ACTIVE | Noted: 2024-06-03

## 2024-06-19 PROBLEM — N18.9 CKD (CHRONIC KIDNEY DISEASE): Status: ACTIVE | Noted: 2024-06-03

## 2024-06-19 PROBLEM — N40.0 BPH (BENIGN PROSTATIC HYPERPLASIA): Status: ACTIVE | Noted: 2024-06-03

## 2024-06-19 PROBLEM — R32 URINARY INCONTINENCE: Status: ACTIVE | Noted: 2024-06-19

## 2024-06-19 PROBLEM — G40.909 SEIZURE DISORDER: Status: ACTIVE | Noted: 2024-06-03

## 2024-06-19 PROBLEM — C70.9: Status: ACTIVE | Noted: 2024-06-03

## 2024-06-19 PROBLEM — I10 HTN (HYPERTENSION): Status: ACTIVE | Noted: 2024-06-03

## 2024-06-19 NOTE — HISTORY OF PRESENT ILLNESS
[FreeTextEntry1] : JOSE ALBERTO MENDEZ is an 84 yo man with PMH of metastatic meningioma (mets to the liver), seizures disorder, right sided hemiparesis, atrial tachycardia/atrial flutteron Xarelto), CAD (s/p CABG and PPM), hypothyroidism, HTN, HLD, CKDV, BPH, iron deficiency anemia and depression who presents for f/u visit at [address].  Pt endorsed worsening depression earlier this week and his lexapro was increased to 20mg QD by one his MD's from [facility].  Today he is inquiring about starting to use condom catheter 2/2 his debility and urinary incontinence. Condom catheter was first started during pt's most recent hospitalization.   =======================   Patient moved to [address] on 5/31/24.  PCP: Dr. Abhishek Shin [phone] Cards: Dr. Alejanrdo Martin [phone]/ HTN clinic: Dr. Shon Larry [phone] Neuro: Dr. Mulugeta Cardona [phone] Neuro (epilepsy): Dr. Yolande Villasenor [phone] Neuro sx: Dr. Buck Gibbons [phone] Radiation oncology: Dr. Abbe Almanza [phone] IR: Dr. Hector Kennedy [phone] Nephro: [facility] hospital   Patient is highly complex and is being managed closely by care team at [facility]. He was recently hospitalized at [facility] for severe seizure and new right sided hemiparesis. Patient was at [facility] and was discharged directly to [facility] on 5/31/24. Patient is planning to continue following with current PCP and various specialists. [facility] team will function as geriatric consultants.  Metastatic meningioma (mets to liver):  -first diagnosed in 2018; meningioma surgically resected by Dr. Gibbons (neurosx [facility]); wife states this went well and patient was able to function independently after this surgery  -in 2020, meningioma recurred. Underwent another surgical resection but could not remove everything. Has been subsequently undergoing Gamma Knife radiosurgery with Dr. Almanza (radiation oncology) since then; since 2020, patient has been able to ambulate with rollator -patient has mets to the liver and has had several SIRT procedures with Dr. Kennedy (IR)  Seizure disorder/Clonus: -complication of meningioma -in 9/2023 patient had first severe seizure and was hospitalized at [facility]/ managed by outpatient neuro/epilepsy team -on 4/2024, patient had another severe seizure and developed right hemiparesis -he is currently ordered for Briviact 200mg qd, clobazam 10mg qam, clobazam 20mg qpm, and lorazepam 1mg q8hrs prn  -has appt with Dr. Yolande Villasenor (neuro) tomorrow  Right hemiparesis:  -since 4/2024 subsequently following seizure  -patient requires daphney lift for transfers -incontinent of urine and stool- uses condom catheter -able to move left side however is very weak -needs PT order  Memory impairment: -most notable since 2020 when seizures began, worse since recent hospitalization in 4/2024 -needs assistance with all ADLs/IADLs  Insomnia: -stable -on melatonin 3mg qhs   Depression: -on lexapro 10mg qd  HTN: -long standing history since he was 22 yo -on lisinopril 10 mg qd, doxazosin 1mg qd, torsemide 10mg qd  -follows with cards  HLD: -on vascepa 2gm qhs and rosuvastatin 10mg qhs  CAD: -h/o left main dissection s/p 2-vessel bypass sx in 1996 -cardiac cath in 2006 s/p PCI to right coronary artery -on xarelto and statin  Iron deficiency anemia: -on daily iron supplementation  Atrial tachycardia/atrial flutter: -s/p ablation -intolerant to BB therapy -s/p PPM -on xarelto 15mg qd  CKD: -states Stage V -follows at [facility] -states baseline Cr ~3  Hypothyroidism: -on levothyroxine 88mcg qd  BPH: -on doxazosin 1mg qd and tamsulosin 0.4mg qd  Gout: -no recent flares -on allopurinol 200mg qd  Constipation: -on miralax bid and senna qhs -was requiring enemas at times during hospitalization  ACP: -wife states that they have completed HCP. Primary HCP is Jud Zuniga (wife)  (C) [phone]/  (H)[phone]. 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 [Full assistance needed] : Assistance needed managing medications [] : Assistance needed managing finances. [Wheelchair] : wheelchair [Hospital Bed] : hospital bed [Smoke Detector] : smoke detector [Carbon Monoxide Detector] : carbon monoxide detector [Stair Lift] : no stair lift used in home [Grab Bars] : grab bars [Night Light] : night light [Anti-Slip Measures] : anti-slip measures [Driving Concerns] : not driving or driving without noted concerns [de-identified] : 0 [de-identified] : 0 [de-identified] : Joi lift for transfers [0] : 2) Feeling down, depressed, or hopeless: Not at all (0) [PHQ-2 Negative - No further assessment needed] : PHQ-2 Negative - No further assessment needed [QOM3Eyucu] : 0 [With Patient/Caregiver] : , with patient/caregiver [Reviewed no changes] : Reviewed, no changes [AdvancecareDate] : 06/24 [FreeTextEntry4] : -wife states that they have completed HCP. Primary HCP is Jud Zuniga (wife)  (C) [phone]/  (h)[phone]. Alternate HCP is daughter, Peyton Franklin [phone]- advised to email to office or bring copy to [place] -also has POA and living will  -no MOLST in place- opened discussion today; wife would like to have family meeting with both daughters to further discuss. Patient to remain full code for now.

## 2024-06-19 NOTE — PHYSICAL EXAM
[Alert] : alert [Sclera] : the sclera and conjunctiva were normal [EOMI] : extraocular movements were intact [Normal Oral Mucosa] : normal oral mucosa [Normal Outer Ear/Nose] : the ears and nose were normal in appearance [Normal Appearance] : the appearance of the neck was normal [Supple] : the neck was supple [No Respiratory Distress] : no respiratory distress [No Acc Muscle Use] : no accessory muscle use [Respiration, Rhythm And Depth] : normal respiratory rhythm and effort [Auscultation Breath Sounds / Voice Sounds] : lungs were clear to auscultation bilaterally [Normal S1, S2] : normal S1 and S2 [Heart Rate And Rhythm] : heart rate was normal and rhythm regular [Edema] : edema was not present [Pedal Pulses Normal] : the pedal pulses are present [Bowel Sounds] : normal bowel sounds [Abdomen Tenderness] : non-tender [Abdomen Soft] : soft [No CVA Tenderness] : no CVA  tenderness [Normal Gait] : abnormal gait [Normal Color / Pigmentation] : normal skin color and pigmentation [Normal Affect] : the affect was normal [Normal Mood] : the mood was normal [Oriented to Person] : oriented to person [Oriented to Place] : oriented to place [Oriented to Time] : disoriented to time [de-identified] : right sided hemiparesis [de-identified] : right sided hemiparesis

## 2024-06-19 NOTE — REVIEW OF SYSTEMS
[Fever] : no fever [Chills] : no chills [Discharge From Eyes] : no purulent discharge from the eyes [Dry Eyes] : no dryness of the eyes [Nasal Discharge] : no nasal discharge [Sore Throat] : no sore throat [Chest Pain] : no chest pain [Palpitations] : no palpitations [Shortness Of Breath] : no shortness of breath [Wheezing] : no wheezing [Cough] : no cough [SOB on Exertion] : no shortness of breath during exertion [Abdominal Pain] : no abdominal pain [Vomiting] : no vomiting [Constipation] : constipation [Diarrhea] : no diarrhea [Dysuria] : no dysuria [Incontinence] : incontinence [Limb Pain] : no limb pain [Skin Wound] : no skin wound [Confused] : confusion [Dizziness] : no dizziness [As Noted in HPI] : as noted in HPI [Suicidal] : not suicidal [Anxiety] : anxiety [Depression] : depression [Negative] : Heme/Lymph

## 2024-06-19 NOTE — ASSESSMENT
[FreeTextEntry1] : - C/w increased lexapro 20mg dose per pt's [facility] team.  - Discussed pros/cons of condom catheter w/ pt extensively today and then w/ [facility] nursing staff. Agreed that it is appropriate to use condom catheter w/ frequent changes given pt's debility and urinary incontinence. Pt is aware from prior conversations during his initial geriatrics evaluation that the condom catheter may not be covered by his insurance in which case he agrees to pay out of pocket.  - Pt is continuing his complex care with his [place] specialist and PCP team.  - We will therefore function mainly for acute issues.  - RTC 4-8 weeks or prn.

## 2024-06-21 ENCOUNTER — LABORATORY RESULT (OUTPATIENT)
Age: 84
End: 2024-06-21

## 2024-06-21 ENCOUNTER — NON-APPOINTMENT (OUTPATIENT)
Age: 84
End: 2024-06-21

## 2024-06-21 RX ORDER — TORSEMIDE 10 MG/1
10 TABLET ORAL
Refills: 0 | Status: ACTIVE | COMMUNITY
Start: 2024-06-03

## 2024-06-21 RX ORDER — CHLORHEXIDINE GLUCONATE 4 %
325 (65 FE) LIQUID (ML) TOPICAL DAILY
Qty: 90 | Refills: 1 | Status: ACTIVE | COMMUNITY
Start: 2024-06-03 | End: 1900-01-01

## 2024-06-21 RX ORDER — CYANOCOBALAMIN (VITAMIN B-12) 1000 MCG
1000 TABLET ORAL DAILY
Qty: 90 | Refills: 1 | Status: ACTIVE | COMMUNITY
Start: 2024-06-03 | End: 1900-01-01

## 2024-06-21 RX ORDER — LORATADINE 10 MG/1
10 TABLET ORAL DAILY
Qty: 90 | Refills: 1 | Status: ACTIVE | COMMUNITY
Start: 2024-06-03 | End: 1900-01-01

## 2024-06-21 RX ORDER — GREEN TEA/HOODIA GORDONII 315-12.5MG
500 CAPSULE ORAL
Qty: 90 | Refills: 1 | Status: ACTIVE | COMMUNITY
Start: 2024-06-03 | End: 1900-01-01

## 2024-06-21 RX ORDER — MULTIVIT-MIN/IRON/FOLIC ACID/K 18-600-40
50 MCG CAPSULE ORAL
Qty: 30 | Refills: 3 | Status: ACTIVE | COMMUNITY
Start: 2024-06-03 | End: 1900-01-01

## 2024-06-24 ENCOUNTER — NON-APPOINTMENT (OUTPATIENT)
Age: 84
End: 2024-06-24

## 2024-07-30 ENCOUNTER — NON-APPOINTMENT (OUTPATIENT)
Age: 84
End: 2024-07-30